# Patient Record
Sex: FEMALE | Race: WHITE | Employment: OTHER | URBAN - METROPOLITAN AREA
[De-identification: names, ages, dates, MRNs, and addresses within clinical notes are randomized per-mention and may not be internally consistent; named-entity substitution may affect disease eponyms.]

---

## 2017-01-04 ENCOUNTER — HOSPITAL ENCOUNTER (OUTPATIENT)
Dept: SPEECH THERAPY | Facility: CLINIC | Age: 77
Setting detail: THERAPIES SERIES
End: 2017-01-04
Attending: INTERNAL MEDICINE
Payer: COMMERCIAL

## 2017-01-04 DIAGNOSIS — I63.9 CEREBROVASCULAR ACCIDENT (CVA), UNSPECIFIED MECHANISM (H): Primary | ICD-10-CM

## 2017-01-04 DIAGNOSIS — S06.5XAA SDH (SUBDURAL HEMATOMA) (H): ICD-10-CM

## 2017-01-04 PROCEDURE — 92523 SPEECH SOUND LANG COMPREHEN: CPT | Mod: GN | Performed by: SPEECH-LANGUAGE PATHOLOGIST

## 2017-01-04 PROCEDURE — 40000211 ZZHC STATISTIC SLP  DEPARTMENT VISIT: Performed by: SPEECH-LANGUAGE PATHOLOGIST

## 2017-01-05 NOTE — PROGRESS NOTES
[facility] OP SPEECH-LANGUAGE EVALUATION   01/04/17 1100       Present No   General Information   Type of Evaluation Speech and Language   Type Of Visit Initial   Start Of Care Date 01/04/17   Referring Physician Dr. Luis Antonio Alvarez MD   Orders Evaluate And Treat   Medical Diagnosis CVA, acute expressive aphasia.    Onset Of Illness/injury Or Date Of Surgery 12/20/16   Precautions/Limitations no known precautions/limitations   Hearing WFL   Surgical/Medical history reviewed Yes   Pertinent History Of Current Problem Mrs. Bucio is a 76 y.o. female with a medical history significant for multiple CVA's, 2003 and 2014. Resulting impairments include acute expressive aphasia and right side weakness. She reports receiving speech and language services following this initial stroke. Mrs. Bucio is wheelchair bound due to physical limitations. Mrs. Bucio was hospitalized on 12/20/20116 through 12/22/2016 due to a stroke. She and her  report that her expressive aphasia is baseline in comparison to her abilities before this most recent stroke. Mrs. Bucio reports she is a member of the [facility] stroke support group. At this time Mrs. Bucio was referred for a formal speech-language evaluation by her physician due to concerns with acute expressive aphasia.    Prior Level Of Function Comment Mrs. Bucio has experinced right side weakness and expressive aphasia following her initial stroke in 2003.    Current Community Support  Family/friend caregiver   Patient Role/employment History Retired;Disabled   Living environment [facility] [place] Mrs. Bucio is a very pleasant and friendly woman.    Patient/family Goals Improve and/or maintain current expressive langauge skills. Learn strategies to aid in word finding.    FALL RISK SCREEN   Have you fallen 2 or more times in the last year? No   Have you fallen and had an injury in the past year? No   Is the patient a fall risk? No   Comments SLP: Pt is  "wheelchair bound at this time.    Pain Assessment   Pain Reported No   Language: Auditory Comprehension (understanding of spoken language)   Tests were administered at the following levels Basic (rote activities);Moderate (routine daily activities)   One Step Commands (out of 10 total) 10   Y/N Simple Questions (out of 10 total) 8   Two Step Commands (out of 5 total) 3   Yes/No Sentence and Simple Paragraph; [place] Diagnostic Aphasia Exam 3 short (out of 6 total) 3   Functional Assessment Scale (Auditory Comprehension) Moderate Impairment   Comments (Auditory Comprehension) SLP: Pt presents with a moderate impairment in auditory comprehension, Pt requires significantly increased processing time in order to complete the A/C tasks.    Language: Verbal Expression (use of spoken language to express information)   Tests were administered at the following levels Basic (rote activities);Moderate (routine daily activities);Complex (vocation/community/social activities)   Automatized Sequences; [place] Diagnostic Aphasia Exam (out of 8 total) 7   Phrase/Sentence Completion (out of 10 total) 6   Responsive Naming; [place] Diagnostic Aphasia Exam 3 (out of 20 total) 11   Generate Sentences; [place] Test for Differential Diagnosis Of Aphasia (out of 6 total) 2   [place] Naming Test, short form (out of 15 total) 4   Generative Naming Score; Cognitive Linguistic Quick Test 5  (Animals, words that start with \"m.\")   Generative Naming; Cognitive Linguistic Quick Test Result Below mean   Functional Assessment Scale (Verbal Expression) Moderate to Severe Impairment   Comments (Verbal Expression) SLP: Pt presents with severe verbal language impairments and requires significantly increased processing time for word generation and narrative planning/organization.    Pragmatics (the social or functional use of a language)   Deficits noted in Nonverbal None   Deficits noted in Conversational Skills None   Deficits noted in the Use of " Linguistic Context None   Deficits noted in the Organization of Narrative; RICE None   Functional Assessment Scale  (Pragmatics) No Impairment   Comments (Pragmatics) SLP: Pt's pragmatic skills are negatively impacted as a result of her expressive aphasia. Pt demonstrates appropriate non -verbal and conversational skills when aphasia is unaccounted for.    Education Assessment   Barriers to Learning Language   Preferred Learning Style Listening;Reading;Demonstration;Pictures/video   General Therapy Interventions   Planned Therapy Interventions Language   Language Auditory comprehension;Verbal expression   Clinical Impression, SLP Eval   Criteria for Skilled Therapeutic Interventions Met yes;treatment indicated   SLP Diagnosis Moderate to severe expressive aphasia, moderate receptive aphasia.    Influenced by the following factors/impairments History of 3 strokes. Pt may be at baseline ability for expressive language.    Functional limitations due to impairments Pt is unable to effectively and efficiently commuincate basic wants and needs.    Therapy Frequency 2 times;per week   Predicted Duration of Therapy Intervention (days/wks) 4-6 weeks pending progress.   (Pt and SLP in agreement of 3 week trial period for progress.)   Risks and Benefits of Treatment have been explained. Yes   Patient, Family & other staff in agreement with plan of care Yes   Clinical Impression Comments Mrs. Bucio presents with moderate to severe expressive and receptive aphasia. At this time she and her  report that she may be near her baseline ability level due to recurrence of strokes since 2003. At this time SLP recommends direct speech and language services 2x/week to teach Mrs. Bucio and her family word finding strategies to implement for improved communication in home and in the community as well as during emergency situations. Based on the results of this evaluation it is deemed medically necessary that Mrs. Bucio receive direct  speech and language services.    Language/Cognition Goals   Language/Cognition Goals 1;2;3   Language/Cognition Goal 1   Goal Identifier LTG-Expressive Language    Goal Description Patient will learn, implement, and demonstrate use of 3 word-finding strategies across 80% of communication breakdowns with min cues to meet daily expressive language needs.     Target Date 03/04/17   Language/Cognition Goal 2   Goal Identifier LTG-Aphasia   Goal Description Patient will complete moderate level word finding tasks with 80% accuracy provided moderate cues for improved communication with family and friends.    Target Date 03/04/17   Language/Cognition Goal 3   Goal Identifier LTG-Receptive Language   Goal Description Patient will complete moderate level receptive language tasks (i.e. following directions, listening to a short paragraph for information) with 80% accuracy provided moderate cues for improved understanding of questions and conversations in the community.    Target Date 03/04/17   Total Session Time   Total Evaluation Time 45 minutes        Thank you for referring Cydney Amador Rupinder to outpatient therapy at [facility] in [place].  Please call Jennifer Dodson MA, SLP-CCC at [phone] or email [email or web address] with any questions or concerns.      Jennifer Dodson M.A., SLP-CCC  Speech-Language Pathologist

## 2017-01-11 ENCOUNTER — HOSPITAL ENCOUNTER (OUTPATIENT)
Dept: SPEECH THERAPY | Facility: CLINIC | Age: 77
Setting detail: THERAPIES SERIES
End: 2017-01-11
Attending: INTERNAL MEDICINE
Payer: COMMERCIAL

## 2017-01-11 PROCEDURE — 92507 TX SP LANG VOICE COMM INDIV: CPT | Mod: GN | Performed by: SPEECH-LANGUAGE PATHOLOGIST

## 2017-01-11 PROCEDURE — 40000211 ZZHC STATISTIC SLP  DEPARTMENT VISIT: Performed by: SPEECH-LANGUAGE PATHOLOGIST

## 2017-01-13 ENCOUNTER — HOSPITAL ENCOUNTER (OUTPATIENT)
Dept: SPEECH THERAPY | Facility: CLINIC | Age: 77
Setting detail: THERAPIES SERIES
End: 2017-01-13
Attending: INTERNAL MEDICINE
Payer: COMMERCIAL

## 2017-01-13 PROCEDURE — 92507 TX SP LANG VOICE COMM INDIV: CPT | Mod: GN | Performed by: SPEECH-LANGUAGE PATHOLOGIST

## 2017-01-13 PROCEDURE — 40000211 ZZHC STATISTIC SLP  DEPARTMENT VISIT: Performed by: SPEECH-LANGUAGE PATHOLOGIST

## 2017-01-17 ENCOUNTER — OFFICE VISIT (OUTPATIENT)
Dept: DERMATOLOGY | Facility: CLINIC | Age: 77
End: 2017-01-17
Payer: COMMERCIAL

## 2017-01-17 VITALS — DIASTOLIC BLOOD PRESSURE: 63 MMHG | OXYGEN SATURATION: 95 % | SYSTOLIC BLOOD PRESSURE: 105 MMHG | HEART RATE: 79 BPM

## 2017-01-17 DIAGNOSIS — L82.1 SEBORRHEIC KERATOSIS: Primary | ICD-10-CM

## 2017-01-17 DIAGNOSIS — L81.4 LENTIGO: ICD-10-CM

## 2017-01-17 PROCEDURE — 99203 OFFICE O/P NEW LOW 30 MIN: CPT | Performed by: PHYSICIAN ASSISTANT

## 2017-01-17 NOTE — NURSING NOTE
"Chief Complaint   Patient presents with     Derm Problem     LESION ON UPPER BACK X ~ 2 YEARS - IS ENLARGING AND HAS TURNED BLACK        Initial /63 mmHg  Pulse 79  SpO2 95% Estimated body mass index is 36.80 kg/(m^2) as calculated from the following:    Height as of 12/20/16: 4' 11\" (1.499 m).    Weight as of 12/20/16: 182 lb 5.1 oz (82.7 kg)..  BP completed using cuff size: melissa Lemus LPN    "

## 2017-01-18 ENCOUNTER — HOSPITAL ENCOUNTER (OUTPATIENT)
Dept: SPEECH THERAPY | Facility: CLINIC | Age: 77
Setting detail: THERAPIES SERIES
End: 2017-01-18
Attending: INTERNAL MEDICINE
Payer: COMMERCIAL

## 2017-01-18 PROCEDURE — 40000211 ZZHC STATISTIC SLP  DEPARTMENT VISIT: Performed by: SPEECH-LANGUAGE PATHOLOGIST

## 2017-01-18 PROCEDURE — 92507 TX SP LANG VOICE COMM INDIV: CPT | Mod: GN | Performed by: SPEECH-LANGUAGE PATHOLOGIST

## 2017-01-19 ENCOUNTER — HOSPITAL ENCOUNTER (OUTPATIENT)
Dept: SPEECH THERAPY | Facility: CLINIC | Age: 77
Setting detail: THERAPIES SERIES
End: 2017-01-19
Attending: INTERNAL MEDICINE
Payer: COMMERCIAL

## 2017-01-19 PROCEDURE — 92507 TX SP LANG VOICE COMM INDIV: CPT | Mod: GN | Performed by: SPEECH-LANGUAGE PATHOLOGIST

## 2017-01-19 PROCEDURE — 40000211 ZZHC STATISTIC SLP  DEPARTMENT VISIT: Performed by: SPEECH-LANGUAGE PATHOLOGIST

## 2017-01-19 NOTE — PROGRESS NOTES
HPI:   Cydney Bucio is a 76 year old female who presents for evaluation of growth on the back  chief complaint  Location: upper back   Condition present for:  Awhile - feels like it has been growing recently. .   Previous treatments include: none  -no personal h/o skin CA - declines FSE today    -She had a fall 1 year ago with subsequent ICH. Month later her  had an AMI (he is here today). They were able to do rehab together and shared a room.     Review Of Systems  Eyes: negative  Ears/Nose/Throat: negative  Respiratory: No shortness of breath, dyspnea on exertion, cough, or hemoptysis  Cardiovascular: negative  Gastrointestinal: negative  Genitourinary: negative  Musculoskeletal: negative  Neurologic: negative  Psychiatric: negative        PHYSICAL EXAM:      Skin exam performed as follows: Type 2 skin. Mood appropriate  Alert and Oriented X 3. Well developed, well nourished in no distress.  General appearance: Normal  Head including face: Normal  Eyes: conjunctiva and lids: Normal  Mouth: Lips, teeth, gums: Normal  Neck: Normal  Chest-breast/axillae: Normal  Back: Normal  Spleen and liver: Normal  Cardiovascular: Exam of peripheral vascular system by observation for swelling, varicosities, edema: Normal  Genitalia: groin, buttocks: Normal  Extremities: digits/nails (clubbing): Normal  Eccrine and Apocrine glands: Normal  Right upper extremity: Normal  Left upper extremity: Normal  Right lower extremity: Normal  Left lower extremity: Normal  Skin: Scalp and body hair: See below    1. Keratotic papule on right upper back  2. Brown flat macules and papules on back, face    ASSESSMENT/PLAN:     1. Seborrheic keratosis on upper back - advised benign no treatment needed  2. Nevi, lentigos on back and face - benign no treatment needed        Follow-up: yearly FSE/PRN  CC:   Scribed By: Indy Bautista, MS, PA-C

## 2017-01-24 ENCOUNTER — CARE COORDINATION (OUTPATIENT)
Dept: CASE MANAGEMENT | Facility: CLINIC | Age: 77
End: 2017-01-24

## 2017-01-25 ENCOUNTER — HOSPITAL ENCOUNTER (OUTPATIENT)
Dept: SPEECH THERAPY | Facility: CLINIC | Age: 77
Setting detail: THERAPIES SERIES
End: 2017-01-25
Attending: INTERNAL MEDICINE
Payer: COMMERCIAL

## 2017-01-25 PROCEDURE — 92507 TX SP LANG VOICE COMM INDIV: CPT | Mod: GN | Performed by: SPEECH-LANGUAGE PATHOLOGIST

## 2017-01-25 PROCEDURE — 40000211 ZZHC STATISTIC SLP  DEPARTMENT VISIT: Performed by: SPEECH-LANGUAGE PATHOLOGIST

## 2017-01-25 NOTE — PROGRESS NOTES
Clinic Care Coordination Contact  [facility]/Voicemail    Referral Source: [facility]    Clinical Data: Care Coordinator Outreach    Outreach attempted x 1.  Left message on voicemail with call back information and requested return call.    Plan: Care Coordinator will try to reach patient again in 3-5 business days.      ROSALBA Kaur, RN, PHN  FPA Care Coordinator  [phone]  January 25, 2017

## 2017-01-26 ENCOUNTER — HOSPITAL ENCOUNTER (OUTPATIENT)
Dept: SPEECH THERAPY | Facility: CLINIC | Age: 77
Setting detail: THERAPIES SERIES
End: 2017-01-26
Attending: INTERNAL MEDICINE
Payer: COMMERCIAL

## 2017-01-26 PROCEDURE — 92507 TX SP LANG VOICE COMM INDIV: CPT | Mod: GN | Performed by: SPEECH-LANGUAGE PATHOLOGIST

## 2017-01-26 PROCEDURE — 40000211 ZZHC STATISTIC SLP  DEPARTMENT VISIT: Performed by: SPEECH-LANGUAGE PATHOLOGIST

## 2017-01-31 ENCOUNTER — HOSPITAL ENCOUNTER (OUTPATIENT)
Dept: SPEECH THERAPY | Facility: CLINIC | Age: 77
Setting detail: THERAPIES SERIES
End: 2017-01-31
Attending: INTERNAL MEDICINE
Payer: COMMERCIAL

## 2017-01-31 PROCEDURE — 92507 TX SP LANG VOICE COMM INDIV: CPT | Mod: GN | Performed by: SPEECH-LANGUAGE PATHOLOGIST

## 2017-01-31 PROCEDURE — 40000211 ZZHC STATISTIC SLP  DEPARTMENT VISIT: Performed by: SPEECH-LANGUAGE PATHOLOGIST

## 2017-01-31 NOTE — PROGRESS NOTES
Outpatient Speech Language Pathology Discharge Note     Patient: Cydney Bucio  : 1940    Beginning/End Dates of Reporting Period:  2017 to 2017    Referring Provider: Dr. Luis Antonio Alvarez MD    Therapy Diagnosis: Severe expressive and moderate receptive aphasia.     Client Self Report: Mrs. Bucio is a 76 y.o. Female with a past medical history significant for 3 strokes with chronic expressive/receptive aphasia. Please see the initial evaluation report in [facility] dated 17 for further information. At the time of the evaluation Pt and family expressed the they thought she may be at baseline for expressive and receptive language skills. SLP recommended Pt initiate treatment on a 2x/week basis for 4-6 weeks with a re-check at 3 weeks to determine if progress was being made. At this time Mrs. Bucio has completed 7 treatment sessions and has demonstrated minimal progress in expressive and receptive language skills. Discharge is appropriate at this time.     Objective Measurements:           Goals:  Goal Identifier LTG-Expressive Language    Goal Description Patient will learn, implement, and demonstrate use of 3 word-finding strategies across 80% of communication breakdowns with min cues to meet daily expressive language needs.     Target Date 17   Date Met   2017   Progress:Goal met with mod to max cues.      Goal Identifier LTG-Aphasia   Goal Description Patient will complete moderate level word finding tasks with 80% accuracy provided moderate cues for improved communication with family and friends.    Target Date 17   Date Met      Progress: Goal not met. Pt is achieving 60-70% accuracy with mod to max cues for use of word finding strategies. Pt and family report this is her baseline skill level.      Goal Identifier LTG-Receptive Language   Goal Description Patient will complete moderate level receptive language tasks (i.e. following directions, listening to a short paragraph for  information) with 80% accuracy provided moderate cues for improved understanding of questions and conversations in the community.    Target Date 03/04/17   Date Met      Progress: Goal not met. Pt is achieving 60-70% accuracy with mod to max cues and repetition. Pt and family report that this baseline.        Progress Toward Goals:   Progress limited due to chronic expressive/receptive aphasia. Pt and family trained in use of word finding strategies,  trained in cueing Pt to use strategies for improved word finding. Pt will continue to require lifelong cueing. SLP and family in agreement that Pt is presenting with baseline speech and language skills. No further treatment recommended at this time.         Plan:  Discharge from therapy.    Discharge: Yes     Reason for Discharge: No further expectation of progress.    Discharge Plan: Patient to continue home program.    Thank you for referring Cydney Bucio to outpatient therapy at [facility] in [place].  Please call Jennifer Dodson MA, SLP-CCC at [phone] or email [email or web address] with any questions or concerns.      Jennifer Dodson M.A., SLP-CCC  Speech-Language Pathologist

## 2017-02-01 NOTE — PROGRESS NOTES
Care Coordination Update    Clinical Data: No call back received from patient or spouse.  She is gong to outpatient therapy as recommended.  She lives with her  and son who are very supportive.    Plan:  No ongoing care coordination needs identified at this time.    ROSALBA Kaur, RN, PHN  [facility] Care Coordinator  [phone]  February 1, 2017

## 2017-02-14 DIAGNOSIS — F33.0 MAJOR DEPRESSIVE DISORDER, RECURRENT EPISODE, MILD (H): Primary | ICD-10-CM

## 2017-02-14 NOTE — TELEPHONE ENCOUNTER
venlafaxine (EFFEXOR-XR) 75 MG 24 hr capsule    Last Written Prescription Date: 11/17/2016  Last Fill Quantity: 90, # refills: 0  Last Office Visit with [facility], [facility] or [facility] prescribing provider: 12/28/2016        BP Readings from Last 3 Encounters:   01/17/17 105/63   12/28/16 118/60   12/22/16 129/73     Pulse: (for Fetzima)  Creatinine   Date Value Ref Range Status   12/20/2016 0.70 0.52 - 1.04 mg/dL Final   ]    Last PHQ-9 score on record=   PHQ-9 SCORE 8/10/2016   Total Score -   Total Score 2

## 2017-02-15 RX ORDER — VENLAFAXINE HYDROCHLORIDE 75 MG/1
75 CAPSULE, EXTENDED RELEASE ORAL DAILY
Qty: 90 CAPSULE | Refills: 1 | Status: SHIPPED | OUTPATIENT
Start: 2017-02-15 | End: 2017-06-29 | Stop reason: DRUGHIGH

## 2017-06-09 ENCOUNTER — RADIANT APPOINTMENT (OUTPATIENT)
Dept: GENERAL RADIOLOGY | Facility: CLINIC | Age: 77
End: 2017-06-09
Attending: PHYSICIAN ASSISTANT
Payer: COMMERCIAL

## 2017-06-09 ENCOUNTER — OFFICE VISIT (OUTPATIENT)
Dept: URGENT CARE | Facility: URGENT CARE | Age: 77
End: 2017-06-09
Payer: COMMERCIAL

## 2017-06-09 VITALS
TEMPERATURE: 100.4 F | RESPIRATION RATE: 24 BRPM | OXYGEN SATURATION: 92 % | DIASTOLIC BLOOD PRESSURE: 66 MMHG | SYSTOLIC BLOOD PRESSURE: 150 MMHG

## 2017-06-09 DIAGNOSIS — R09.89 CHEST CONGESTION: ICD-10-CM

## 2017-06-09 DIAGNOSIS — R06.02 SOB (SHORTNESS OF BREATH): Primary | ICD-10-CM

## 2017-06-09 DIAGNOSIS — R06.2 WHEEZING: ICD-10-CM

## 2017-06-09 DIAGNOSIS — R06.02 SOB (SHORTNESS OF BREATH): ICD-10-CM

## 2017-06-09 DIAGNOSIS — J18.9 PNEUMONIA DUE TO INFECTIOUS ORGANISM, UNSPECIFIED LATERALITY, UNSPECIFIED PART OF LUNG: ICD-10-CM

## 2017-06-09 LAB
ANION GAP SERPL CALCULATED.3IONS-SCNC: 7 MMOL/L (ref 3–14)
BASOPHILS # BLD AUTO: 0 10E9/L (ref 0–0.2)
BASOPHILS NFR BLD AUTO: 0.2 %
BUN SERPL-MCNC: 17 MG/DL (ref 7–30)
CALCIUM SERPL-MCNC: 8.6 MG/DL (ref 8.5–10.1)
CHLORIDE SERPL-SCNC: 107 MMOL/L (ref 94–109)
CO2 SERPL-SCNC: 28 MMOL/L (ref 20–32)
CREAT SERPL-MCNC: 0.66 MG/DL (ref 0.52–1.04)
DIFFERENTIAL METHOD BLD: ABNORMAL
EOSINOPHIL # BLD AUTO: 0.1 10E9/L (ref 0–0.7)
EOSINOPHIL NFR BLD AUTO: 0.5 %
ERYTHROCYTE [DISTWIDTH] IN BLOOD BY AUTOMATED COUNT: 14.7 % (ref 10–15)
GFR SERPL CREATININE-BSD FRML MDRD: 87 ML/MIN/1.7M2
GLUCOSE SERPL-MCNC: 124 MG/DL (ref 70–99)
HCT VFR BLD AUTO: 44.1 % (ref 35–47)
HGB BLD-MCNC: 13.9 G/DL (ref 11.7–15.7)
LYMPHOCYTES # BLD AUTO: 1.9 10E9/L (ref 0.8–5.3)
LYMPHOCYTES NFR BLD AUTO: 9.9 %
MCH RBC QN AUTO: 30.8 PG (ref 26.5–33)
MCHC RBC AUTO-ENTMCNC: 31.5 G/DL (ref 31.5–36.5)
MCV RBC AUTO: 98 FL (ref 78–100)
MONOCYTES # BLD AUTO: 1.6 10E9/L (ref 0–1.3)
MONOCYTES NFR BLD AUTO: 8.5 %
NEUTROPHILS # BLD AUTO: 15.2 10E9/L (ref 1.6–8.3)
NEUTROPHILS NFR BLD AUTO: 80.9 %
PLATELET # BLD AUTO: 196 10E9/L (ref 150–450)
POTASSIUM SERPL-SCNC: 3.8 MMOL/L (ref 3.4–5.3)
RBC # BLD AUTO: 4.52 10E12/L (ref 3.8–5.2)
SODIUM SERPL-SCNC: 142 MMOL/L (ref 133–144)
WBC # BLD AUTO: 18.9 10E9/L (ref 4–11)

## 2017-06-09 PROCEDURE — 94640 AIRWAY INHALATION TREATMENT: CPT | Performed by: PHYSICIAN ASSISTANT

## 2017-06-09 PROCEDURE — 80048 BASIC METABOLIC PNL TOTAL CA: CPT | Performed by: PHYSICIAN ASSISTANT

## 2017-06-09 PROCEDURE — 99214 OFFICE O/P EST MOD 30 MIN: CPT | Mod: 25 | Performed by: PHYSICIAN ASSISTANT

## 2017-06-09 PROCEDURE — 85025 COMPLETE CBC W/AUTO DIFF WBC: CPT | Performed by: PHYSICIAN ASSISTANT

## 2017-06-09 PROCEDURE — 71010 XR CHEST 1 VW: CPT

## 2017-06-09 PROCEDURE — 36415 COLL VENOUS BLD VENIPUNCTURE: CPT | Performed by: PHYSICIAN ASSISTANT

## 2017-06-09 RX ORDER — MOXIFLOXACIN HYDROCHLORIDE 400 MG/1
400 TABLET ORAL DAILY
Qty: 7 TABLET | Refills: 0 | Status: SHIPPED | OUTPATIENT
Start: 2017-06-09 | End: 2017-06-17

## 2017-06-09 RX ORDER — ALBUTEROL SULFATE 0.83 MG/ML
1 SOLUTION RESPIRATORY (INHALATION) EVERY 4 HOURS PRN
Qty: 1 BOX | Refills: 0
Start: 2017-06-09 | End: 2017-06-17

## 2017-06-09 RX ORDER — ALBUTEROL SULFATE 0.83 MG/ML
1 SOLUTION RESPIRATORY (INHALATION) ONCE
Qty: 3 ML | Refills: 0 | Status: SHIPPED
Start: 2017-06-09 | End: 2017-06-09

## 2017-06-09 RX ORDER — PREDNISONE 10 MG/1
10 TABLET ORAL 3 TIMES DAILY
Qty: 15 TABLET | Refills: 0 | Status: SHIPPED | OUTPATIENT
Start: 2017-06-09 | End: 2017-06-14

## 2017-06-09 NOTE — MR AVS SNAPSHOT
After Visit Summary   6/9/2017    Cydney Bucio    MRN: [id number]           Patient Information     Date Of Birth          1940        Visit Information        Provider Department      6/9/2017 11:00 AM Jairo Fonseca PA-C [facility]        Today's Diagnoses     SOB (shortness of breath)    -  1    Chest congestion        Pneumonia due to infectious organism, unspecified laterality, unspecified part of lung        Wheezing           Follow-ups after your visit        Who to contact     If you have questions or need follow up information about today's clinic visit or your schedule please contact [facility] [place] directly at [phone].  Normal or non-critical lab and imaging results will be communicated to you by MyChart, letter or phone within 4 business days after the clinic has received the results. If you do not hear from us within 7 days, please contact the clinic through [employer] or phone. If you have a critical or abnormal lab result, we will notify you by phone as soon as possible.  Submit refill requests through [employer] or call your pharmacy and they will forward the refill request to us. Please allow 3 business days for your refill to be completed.          Additional Information About Your Visit        MyChart Information     [employer] gives you secure access to your electronic health record. If you see a primary care provider, you can also send messages to your care team and make appointments. If you have questions, please call your primary care clinic.  If you do not have a primary care provider, please call [phone] and they will assist you.        Care EveryWhere ID     This is your Care EveryWhere ID. This could be used by other organizations to access your [facility] medical records  [id number]        Your Vitals Were     Temperature Respirations Pulse Oximetry             100.4  F (38  C) (Oral) 24 92%          Blood Pressure  from Last 3 Encounters:   06/09/17 150/66   01/17/17 105/63   12/28/16 118/60    Weight from Last 3 Encounters:   12/22/16 182 lb 5.1 oz (82.7 kg)   12/05/16 186 lb (84.4 kg)   08/10/16 186 lb (84.4 kg)              We Performed the Following     ALBUTEROL UNIT DOSE, 1 MG     Basic metabolic panel  (Ca, Cl, CO2, Creat, Gluc, K, Na, BUN)     CBC with platelets differential     INHALATION/NEBULIZER TREATMENT, INITIAL          Today's Medication Changes          These changes are accurate as of: 6/9/17  1:34 PM.  If you have any questions, ask your nurse or doctor.               Start taking these medicines.        Dose/Directions    * albuterol (2.5 MG/3ML) 0.083% neb solution   Used for:  SOB (shortness of breath), Chest congestion   Started by:  Jairo Fonseca PA-C        Dose:  1 vial   Take 1 vial (2.5 mg) by nebulization once for 1 dose   Quantity:  3 mL   Refills:  0       * albuterol (2.5 MG/3ML) 0.083% neb solution   Used for:  SOB (shortness of breath)   Started by:  Jairo Fonseca PA-C        Dose:  1 vial   Take 1 vial (2.5 mg) by nebulization every 4 hours as needed for shortness of breath / dyspnea or wheezing   Quantity:  1 Box   Refills:  0       moxifloxacin 400 MG tablet   Commonly known as:  AVELOX   Used for:  Chest congestion, Pneumonia due to infectious organism, unspecified laterality, unspecified part of lung   Started by:  Jairo Fonseca PA-C        Dose:  400 mg   Take 1 tablet (400 mg) by mouth daily   Quantity:  7 tablet   Refills:  0       predniSONE 10 MG tablet   Commonly known as:  DELTASONE   Used for:  Wheezing, SOB (shortness of breath)   Started by:  Jairo Fonseca PA-C        Dose:  10 mg   Take 1 tablet (10 mg) by mouth 3 times daily for 5 days   Quantity:  15 tablet   Refills:  0       * Notice:  This list has 2 medication(s) that are the same as other medications prescribed for you. Read the directions carefully, and ask your doctor or other care provider to review them  with you.      These medicines have changed or have updated prescriptions.        Dose/Directions    * order for DME   This may have changed:  Another medication with the same name was added. Make sure you understand how and when to take each.   Used for:  SDH (subdural hematoma) (H)   Changed by:  Jeffery Zamora MD        Equipment being ordered: Bedside commode   Quantity:  1 Units   Refills:  0       * order for DME   This may have changed:  You were already taking a medication with the same name, and this prescription was added. Make sure you understand how and when to take each.   Used for:  Pneumonia due to infectious organism, unspecified laterality, unspecified part of lung, Wheezing   Changed by:  Jairo Fonseca PA-C        Nebulizer with tubing   Quantity:  1 Device   Refills:  0       * Notice:  This list has 2 medication(s) that are the same as other medications prescribed for you. Read the directions carefully, and ask your doctor or other care provider to review them with you.         Where to get your medicines      These medications [facility]/pharmacy [facility][id number] [facility] [address] [address] [facility]  [address] [facility] [address]     Phone:  [phone]     moxifloxacin 400 MG tablet    predniSONE 10 MG tablet         Some of these will need a paper prescription and others can be bought over the counter.  Ask your nurse if you have questions.     Bring a paper prescription for each of these medications     order for DME       You don't need a prescription for these medications     albuterol (2.5 MG/3ML) 0.083% neb solution    albuterol (2.5 MG/3ML) 0.083% neb solution                Primary Care Provider Office Phone # Fax #    Jeffery Zamora [phone]       [facility] [address]  [facility] [address]        Thank you!     Thank you for choosing [facility]  for your care. Our goal is always to provide you  with excellent care. Hearing back from our patients is one way we can continue to improve our services. Please take a few minutes to complete the written survey that you may receive in the mail after your visit with us. Thank you!             Your Updated Medication List - Protect others around you: Learn how to safely use, store and throw away your medicines at www.disposemymeds.org.          This list is accurate as of: 6/9/17  1:34 PM.  Always use your most recent med list.                   Brand Name Dispense Instructions for use    acetaminophen 325 MG tablet    TYLENOL     Take 650 mg by mouth 3 times daily as needed for mild pain       * albuterol (2.5 MG/3ML) 0.083% neb solution     3 mL    Take 1 vial (2.5 mg) by nebulization once for 1 dose       * albuterol (2.5 MG/3ML) 0.083% neb solution     1 Box    Take 1 vial (2.5 mg) by nebulization every 4 hours as needed for shortness of breath / dyspnea or wheezing       alendronate 70 MG tablet    FOSAMAX         calcium carbonate-vitamin D 600-200 MG-UNIT Tabs      Take 1 tablet by mouth 2 times daily       cetirizine 10 MG tablet    zyrTEC     Take 10 mg by mouth daily       ELIQUIS PO      Take 5 mg by mouth 2 times daily       KEPPRA PO      Take 1,000 mg by mouth 2 times daily       moxifloxacin 400 MG tablet    AVELOX    7 tablet    Take 1 tablet (400 mg) by mouth daily       * order for DME     1 Units    Equipment being ordered: Bedside commode       * order for DME     1 Device    Nebulizer with tubing       potassium chloride SA 20 MEQ CR tablet    potassium chloride    180 tablet    Take 1 tablet (20 mEq) by mouth 2 times daily       predniSONE 10 MG tablet    DELTASONE    15 tablet    Take 1 tablet (10 mg) by mouth 3 times daily for 5 days       senna-docusate 8.6-50 MG per tablet    SENOKOT-S;PERICOLACE     Take 2 tablets by mouth daily as needed for constipation       simvastatin 20 MG tablet    ZOCOR    90 tablet    Take 1 tablet (20 mg) by mouth  At Bedtime       venlafaxine 75 MG 24 hr capsule    EFFEXOR-XR    90 capsule    Take 1 capsule (75 mg) by mouth daily       vitamin E 400 UNITS Tabs     MONTH    qd       * Notice:  This list has 4 medication(s) that are the same as other medications prescribed for you. Read the directions carefully, and ask your doctor or other care provider to review them with you.

## 2017-06-09 NOTE — NURSING NOTE
"Chief Complaint   Patient presents with     Cough     cough,congestion,shortness of breath for past week       Initial /66 (BP Location: Left arm, Patient Position: Chair, Cuff Size: Adult Regular)  Temp 100.4  F (38  C) (Oral)  Resp 24  SpO2 92% Estimated body mass index is 36.82 kg/(m^2) as calculated from the following:    Height as of 12/20/16: 4' 11\" (1.499 m).    Weight as of 12/22/16: 182 lb 5.1 oz (82.7 kg).  Medication Reconciliation: complete   Daysi MACHADO    "

## 2017-06-09 NOTE — PROGRESS NOTES
SUBJECTIVE:   Cydney Bucio is a 76 year old female presenting with a chief complaint of chest congestion wheezing, SOB and chills.  Onset of symptoms was 1 week(s) ago.  Course of illness is worsening.    Severity moderately severe  Current and Associated symptoms: SOB and wheezing  Treatment measures tried include OTC meds.  Predisposing factors include recent illness.    Past Medical History:   Diagnosis Date     Acute, but ill-defined, cerebrovascular disease      Allergic rhinitis due to other allergen      Aphasia due to stroke (H)      Coronary artery disease 3-12-15     Depressive disorder, not elsewhere classified      Edema      History of seizure 8/10/2016    --4/2016 --Generalized seizure in context of SDH, while on full anticoagulation      HYPERTENSION NOS 6/6/2006     Osteoporosis, unspecified      Other and unspecified hyperlipidemia      SDH (subdural hematoma) (H) 4/12/2016    --4/2016 --Presented with generalized seizure, while on warfarin for afib with Hx of CVA --S/P Craniotomy --Off anticoagulation      Unspecified cerebral artery occlusion with cerebral infarction         Allergies   Allergen Reactions     No Known Allergies          Social History   Substance Use Topics     Smoking status: Never Smoker     Smokeless tobacco: Never Used     Alcohol use No       ROS:  CONSTITUTIONAL:POSITIVE  for fever and chills  INTEGUMENTARY/SKIN: NEGATIVE for worrisome rashes, moles or lesions  ENT/MOUTH: Positive for nasal congestion  RESP:POSITIVE for cough-productive, SOB/dyspnea   CV: NEGATIVE for chest pain, palpitations or peripheral edema  GI: NEGATIVE for nausea, abdominal pain, heartburn, or change in bowel habits  MUSCULOSKELETAL: Negative for body aches  NEURO: NEGATIVE for weakness, dizziness or paresthesias    OBJECTIVE  :/66 (BP Location: Left arm, Patient Position: Chair, Cuff Size: Adult Regular)  Temp 100.4  F (38  C) (Oral)  Resp 24  SpO2 92%  GENERAL APPEARANCE: healthy, alert and  no distress  EYES: EOMI,  PERRL, conjunctiva clear  HENT: ear canals and TM's normal.  Nose and mouth without ulcers, erythema or lesions  NECK: supple, nontender, no lymphadenopathy  RESP: expiratory wheezes generalized  CV: regular rates and rhythm, normal S1 S2, no murmur noted  ABDOMEN:  soft, nontender, no HSM or masses and bowel sounds normal  NEURO: Normal strength and tone, sensory exam grossly normal,  normal speech and mentation  SKIN: no suspicious lesions or rashes    Results for orders placed or performed in visit on 06/09/17   CBC with platelets differential   Result Value Ref Range    WBC 18.9 (H) 4.0 - 11.0 10e9/L    RBC Count 4.52 3.8 - 5.2 10e12/L    Hemoglobin 13.9 11.7 - 15.7 g/dL    Hematocrit 44.1 35.0 - 47.0 %    MCV 98 78 - 100 fl    MCH 30.8 26.5 - 33.0 pg    MCHC 31.5 31.5 - 36.5 g/dL    RDW 14.7 10.0 - 15.0 %    Platelet Count 196 150 - 450 10e9/L    Diff Method Automated Method     % Neutrophils 80.9 %    % Lymphocytes 9.9 %    % Monocytes 8.5 %    % Eosinophils 0.5 %    % Basophils 0.2 %    Absolute Neutrophil 15.2 (H) 1.6 - 8.3 10e9/L    Absolute Lymphocytes 1.9 0.8 - 5.3 10e9/L    Absolute Monocytes 1.6 (H) 0.0 - 1.3 10e9/L    Absolute Eosinophils 0.1 0.0 - 0.7 10e9/L    Absolute Basophils 0.0 0.0 - 0.2 10e9/L   Basic metabolic panel  (Ca, Cl, CO2, Creat, Gluc, K, Na, BUN)   Result Value Ref Range    Sodium 142 133 - 144 mmol/L    Potassium 3.8 3.4 - 5.3 mmol/L    Chloride 107 94 - 109 mmol/L    Carbon Dioxide 28 20 - 32 mmol/L    Anion Gap 7 3 - 14 mmol/L    Glucose 124 (H) 70 - 99 mg/dL    Urea Nitrogen 17 7 - 30 mg/dL    Creatinine 0.66 0.52 - 1.04 mg/dL    GFR Estimate 87 >60 mL/min/1.7m2    GFR Estimate If Black >90   GFR Calc   >60 mL/min/1.7m2    Calcium 8.6 8.5 - 10.1 mg/dL     CHEST ONE VIEW  6/9/2017 12:10 PM      HISTORY: Shortness of breath. Other specified symptoms and signs  involving the circulatory and respiratory systems.     COMPARISON: December 11,  2014         IMPRESSION: Heart size is normal considering AP technique. No pleural  effusion, pneumothorax, or abnormal area of consolidation. The  pulmonary vasculature appears somewhat prominent, especially in  comparison to previous exam. No acute osseous abnormality.      Albuterol neb treatment given in chart    ASSESSMENT/PLAN:      ICD-10-CM    1. SOB (shortness of breath) R06.02 CBC with platelets differential     INHALATION/NEBULIZER TREATMENT, INITIAL     albuterol (2.5 MG/3ML) 0.083% neb solution     Basic metabolic panel  (Ca, Cl, CO2, Creat, Gluc, K, Na, BUN)     ALBUTEROL UNIT DOSE, 1 MG     albuterol (2.5 MG/3ML) 0.083% neb solution     predniSONE (DELTASONE) 10 MG tablet     CANCELED: XR Chest 2 Views   2. Chest congestion R09.89 CBC with platelets differential     INHALATION/NEBULIZER TREATMENT, INITIAL     albuterol (2.5 MG/3ML) 0.083% neb solution     ALBUTEROL UNIT DOSE, 1 MG     moxifloxacin (AVELOX) 400 MG tablet     CANCELED: XR Chest 2 Views   3. Pneumonia due to infectious organism, unspecified laterality, unspecified part of lung J18.9 moxifloxacin (AVELOX) 400 MG tablet     order for DME   4. Wheezing R06.2 order for DME     predniSONE (DELTASONE) 10 MG tablet       Fluids  Rest  Follow up with PCP as needed  Go to the ED if symptoms worsen

## 2017-06-17 ENCOUNTER — APPOINTMENT (OUTPATIENT)
Dept: GENERAL RADIOLOGY | Facility: CLINIC | Age: 77
End: 2017-06-17
Attending: EMERGENCY MEDICINE
Payer: COMMERCIAL

## 2017-06-17 ENCOUNTER — HOSPITAL ENCOUNTER (EMERGENCY)
Facility: CLINIC | Age: 77
Discharge: HOME | End: 2017-06-17
Attending: EMERGENCY MEDICINE | Admitting: EMERGENCY MEDICINE
Payer: COMMERCIAL

## 2017-06-17 VITALS
WEIGHT: 180 LBS | BODY MASS INDEX: 35.34 KG/M2 | HEART RATE: 100 BPM | OXYGEN SATURATION: 94 % | HEIGHT: 60 IN | TEMPERATURE: 98.5 F | DIASTOLIC BLOOD PRESSURE: 42 MMHG | RESPIRATION RATE: 20 BRPM | SYSTOLIC BLOOD PRESSURE: 108 MMHG

## 2017-06-17 DIAGNOSIS — J20.9 ACUTE BRONCHITIS, UNSPECIFIED ORGANISM: ICD-10-CM

## 2017-06-17 LAB
ANION GAP SERPL CALCULATED.3IONS-SCNC: 6 MMOL/L (ref 3–14)
BASOPHILS # BLD AUTO: 0.1 10E9/L (ref 0–0.2)
BASOPHILS NFR BLD AUTO: 0.5 %
BUN SERPL-MCNC: 20 MG/DL (ref 7–30)
CALCIUM SERPL-MCNC: 8.4 MG/DL (ref 8.5–10.1)
CHLORIDE SERPL-SCNC: 105 MMOL/L (ref 94–109)
CO2 SERPL-SCNC: 30 MMOL/L (ref 20–32)
CREAT SERPL-MCNC: 0.72 MG/DL (ref 0.52–1.04)
DIFFERENTIAL METHOD BLD: ABNORMAL
EOSINOPHIL # BLD AUTO: 0.6 10E9/L (ref 0–0.7)
EOSINOPHIL NFR BLD AUTO: 4.2 %
ERYTHROCYTE [DISTWIDTH] IN BLOOD BY AUTOMATED COUNT: 14.2 % (ref 10–15)
GFR SERPL CREATININE-BSD FRML MDRD: 78 ML/MIN/1.7M2
GLUCOSE SERPL-MCNC: 154 MG/DL (ref 70–99)
HCT VFR BLD AUTO: 42.8 % (ref 35–47)
HGB BLD-MCNC: 13.8 G/DL (ref 11.7–15.7)
IMM GRANULOCYTES # BLD: 0.1 10E9/L (ref 0–0.4)
IMM GRANULOCYTES NFR BLD: 0.8 %
LYMPHOCYTES # BLD AUTO: 2.3 10E9/L (ref 0.8–5.3)
LYMPHOCYTES NFR BLD AUTO: 17.7 %
MCH RBC QN AUTO: 30.1 PG (ref 26.5–33)
MCHC RBC AUTO-ENTMCNC: 32.2 G/DL (ref 31.5–36.5)
MCV RBC AUTO: 93 FL (ref 78–100)
MONOCYTES # BLD AUTO: 1.5 10E9/L (ref 0–1.3)
MONOCYTES NFR BLD AUTO: 11.6 %
NEUTROPHILS # BLD AUTO: 8.6 10E9/L (ref 1.6–8.3)
NEUTROPHILS NFR BLD AUTO: 65.2 %
NRBC # BLD AUTO: 0 10*3/UL
NRBC BLD AUTO-RTO: 0 /100
NT-PROBNP SERPL-MCNC: 703 PG/ML (ref 0–1800)
PLATELET # BLD AUTO: 242 10E9/L (ref 150–450)
POTASSIUM SERPL-SCNC: 3.5 MMOL/L (ref 3.4–5.3)
RBC # BLD AUTO: 4.58 10E12/L (ref 3.8–5.2)
SODIUM SERPL-SCNC: 141 MMOL/L (ref 133–144)
WBC # BLD AUTO: 13.2 10E9/L (ref 4–11)

## 2017-06-17 PROCEDURE — 83880 ASSAY OF NATRIURETIC PEPTIDE: CPT | Performed by: EMERGENCY MEDICINE

## 2017-06-17 PROCEDURE — 80048 BASIC METABOLIC PNL TOTAL CA: CPT | Performed by: EMERGENCY MEDICINE

## 2017-06-17 PROCEDURE — 71020 XR CHEST 2 VW: CPT

## 2017-06-17 PROCEDURE — 87040 BLOOD CULTURE FOR BACTERIA: CPT | Performed by: EMERGENCY MEDICINE

## 2017-06-17 PROCEDURE — 36415 COLL VENOUS BLD VENIPUNCTURE: CPT | Performed by: EMERGENCY MEDICINE

## 2017-06-17 PROCEDURE — 93005 ELECTROCARDIOGRAM TRACING: CPT

## 2017-06-17 PROCEDURE — 99285 EMERGENCY DEPT VISIT HI MDM: CPT | Mod: 25

## 2017-06-17 PROCEDURE — 85025 COMPLETE CBC W/AUTO DIFF WBC: CPT | Performed by: EMERGENCY MEDICINE

## 2017-06-17 RX ORDER — AZITHROMYCIN 250 MG/1
TABLET, FILM COATED ORAL
Qty: 6 TABLET | Refills: 0 | Status: SHIPPED | OUTPATIENT
Start: 2017-06-17 | End: 2017-06-22

## 2017-06-17 RX ORDER — LIDOCAINE 40 MG/G
CREAM TOPICAL
Status: DISCONTINUED | OUTPATIENT
Start: 2017-06-17 | End: 2017-06-17 | Stop reason: HOSPADM

## 2017-06-17 ASSESSMENT — ENCOUNTER SYMPTOMS
NUMBNESS: 0
COUGH: 1
FEVER: 0
CHILLS: 0
SHORTNESS OF BREATH: 1

## 2017-06-17 NOTE — ED NOTES
Pt presents for evaluation of shortness of breath, RUIZ, generalized weakness and cough since last Friday. Pt was seen at her clinic last Friday and was diagnosed with pneumonia and was given amoxicillin and prednisone. Pt finished all meds, got slightly better with symptoms returning last night and progressively getting worse.

## 2017-06-17 NOTE — ED PROVIDER NOTES
History     Chief Complaint:  Shortness of Breath    HPI   Cydney Bucio is a 76 year old female who presents to the ED with her  for evaluation of shortness of breath. The patient reports that she was seen in clinic nine days ago for symptoms of shortness of breath, dyspnea on exertion, generalized weakness, and cough, and was diagnosed with pneumonia. Patient was provided with amoxicillin and prednisone. She states she felt slightly improved, but after finishing her medication she noticed a return of her symptoms last night, and these have been worsening. She reports her shortness of breath is worsened if she is lying down flat, and thus tries to sleep at a 30 degree angle. She denies any history of CHF or heart problems. She denies any fever, chills, or numbness. Patient is not a smoker and has not been around secondhand smoke. Of note, the patient has had several strokes in the past and has expressive aphasia.     XR Chest 1 View ([facility] - 6/9/2017)  Heart size is normal considering AP technique. No pleural effusion, pneumothorax, or abnormal area of consolidation. The pulmonary vasculature appears somewhat prominent, especially in  comparison to previous exam. No acute osseous abnormality.  Per radiology.     Allergies:  NKDA     Medications:    Avelox  Albuterol   Venlafaxine  Fosamax  Keppra  Eliquis  Zocor  Zyrtec  Calcium carbonate-Vitamin D  Tylenol  Senna-docusate  Potassium chloride   Vitamin E     Past Medical History:    Acute, but ill-defined, cerebrovascular disease  Allergic rhinitis  Aphasia due to stroke  Coronary artery disease - 3/12/2015  Depressive disorder  Edema  History of seizure - 8/10/2016  Hypertension - 6/6/2006  Osteoporosis   Hyperlipidemia   Subdural hematoma - 4/12/2016  Unspecified cerebral artery occlusion with cerebral infarction     Past Surgical History:    Vertarra fused lower back - 2002  Hymenotomy   Tonsillectomy   D&C - 1988 &  1997  Hysteroscopy   Laminectomy - 2003  Colonoscopy   Craniotomy, left - 4/13/2016  Orthopedic surgery     Family History:    Mother - MI, uterine cancer, diabetes, coronary artery disease  Father - CHF, multiple MIs, AAA, colon cancer, colorectal cancer  Sister - osteoarthritis     Social History:  The patient was accompanied to the ED by her .  Smoking Status: Never smoker  Smokeless Tobacco: Never used  Alcohol Use: No  Marital Status:        Review of Systems   Constitutional: Negative for chills and fever.   Respiratory: Positive for cough and shortness of breath.         Positive for dyspnea on exertion.    Neurological: Negative for numbness.   All other systems reviewed and are negative.    Physical Exam   First Vitals:  BP: 132/69  Pulse: 100  Temp: 98.5  F (36.9  C)  Resp: 28  Height: 152.4 cm (5')  Weight: 81.6 kg (180 lb)  SpO2: 94 %      Physical Exam   General: Patient is alert and cooperative.  HENT: Mild nasal congestion, no drainage.   Eyes: EOMI, PERRLA.  Normal conjunctiva.  Neck: Normal range of motion. Neck supple.  Cardiovascular: Normal rate, regular rhythm and normal heart sounds.   Pulmonary/Chest: occasional cough, coarse rhonchi, no wheezing.   Abdominal: Soft. Patient exhibits no distension and no mass. There is no tenderness.       Musculoskeletal: Normal range of motion. Patient exhibits no edema and no tenderness.   Neurological: Patient  is alert and oriented. Coordination normal, grossly intact.  Skin: Skin is warm and dry. No rash noted.   Psychiatric: Patient has a normal mood and affect. Normal behavior and judgement.      Emergency Department Course   ECG done at 15:45. ECG read at 15:55:  Rate 97 bpm. MN interval 142. QRS duration 86. QT/QTc 360/457. P-R-T axes 62 -13 72.  Normal sinus rhythm. Cannot rule out inferior infarct, age undetermined. Abnormal ECG.     Imaging:  Radiographic findings were communicated with the patient who voiced understanding of the  findings.  XR Chest 2 Views:  Clear lungs.   Per radiology.     Laboratory:  Blood culture x2: Pending  CBC: WBC 13.2 high, o/w WNL (HGB 13.8, )   BMP: Glucose 154 high, calcium 8.4 low, o/w WNL (creatinine 0.72)   BNP: 703    Emergency Department Course:  Nursing notes and vitals reviewed.  I performed an exam of the patient as documented above.     Blood drawn. This was sent to the lab for further testing, results above.  The patient was sent for a chest XR while here in the emergency department, findings above.    Findings and plan explained to the Patient. Patient discharged home with instructions regarding supportive care, medications, and reasons to return. The importance of close follow-up was reviewed. The patient was prescribed Azithromycin.    Impression & Plan      Medical Decision Making:  Cydney Bucio is an afebrile 76 year old female who arrives by car with spouse for evaluation of cough and shortness of breath. They are reporting that she was seen in clinic on 6/9/2017, and was prescribed a course of antibiotics for possible pneumonia. She has completed a course of moxifloxacin, reporting initial improvement but subsequent recurrence of symptoms, prompting her visit today. Review of chest x-ray from that visit shows no reported evidence of pneumonia radiographically. On arrival here, she has frequent, congested sounding cough and some coarse rhonchi, but normal oxygen saturations. Her workup here has been unremarkable. Chest x-ray shows no evidence of pneumonia or other acute abnormalities. She has a normal electrocardiogram, normal BNP, and very mild nonspecific leukocytosis. I believe she is stable for discharge home. Given her age and comorbidities along with a congested sounding (and at times, productive) cough, I have recommended treatment for possible acute bronchitis and early follow up. There is no concern for a cardiogenic etiology.     Diagnosis:    ICD-10-CM    1. Acute bronchitis,  unspecified organism J20.9      Disposition:  Discharged to home.    Discharge Medications:  New Prescriptions    AZITHROMYCIN (ZITHROMAX Z-JAVIER) 250 MG TABLET    Two tablets on the first day, then one tablet daily for the next 4 days       Rachid MAGALLANES, am serving as a scribe at 3:49 PM on 6/17/2017 to document services personally performed by Aquiles Gonzalez MD based on my observations and the provider's statements to me.   [facility] EMERGENCY DEPARTMENT       Aquiles Gonzalez MD  06/18/17 1011

## 2017-06-17 NOTE — DISCHARGE INSTRUCTIONS
Bronchitis, Antibiotic Treatment (Adult)    Bronchitis is an infection of the air passages (bronchial tubes) in your lungs. It often occurs when you have a cold. This illness is contagious during the first few days and is spread through the air by coughing and sneezing, or by direct contact (touching the sick person and then touching your own eyes, nose, or mouth).  Symptoms of bronchitis include cough with mucus (phlegm) and low-grade fever. Bronchitis usually lasts 7 to 14 days. Mild cases can be treated with simple home remedies. More severe infection is treated with an antibiotic.  Home care  Follow these guidelines when caring for yourself at home:    If your symptoms are severe, rest at home for the first 2 to 3 days. When you go back to your usual activities, don't let yourself get too tired.    Do not smoke. Also avoid being exposed to secondhand smoke.    You may use over-the-counter medicines to control fever or pain, unless another medicine was prescribed. (Note: If you have chronic liver or kidney disease or have ever had a stomach ulcer or gastrointestinal bleeding, talk with your healthcare provider before using these medicines. Also talk to your provider if you are taking medicine to prevent blood clots.) Aspirin should never be given to anyone younger than 18 years of age who is ill with a viral infection or fever. It may cause severe liver or brain damage.    Your appetite may be poor, so a light diet is fine. Avoid dehydration by drinking 6 to 8 glasses of fluids per day (such as water, soft drinks, sports drinks, juices, tea, or soup). Extra fluids will help loosen secretions in the nose and lungs.    Over-the-counter cough, cold, and sore-throat medicines will not shorten the length of the illness, but they may be helpful to reduce symptoms. (Note: Do not use decongestants if you have high blood pressure.)    Finish all antibiotic medicine. Do this even if you are feeling better after only a  few days.  Follow-up care  Follow up with your healthcare provider, or as advised. If you had an X-ray or ECG (electrocardiogram), a specialist will review it. You will be notified of any new findings that may affect your care.  Note: If you are age 65 or older, or if you have a chronic lung disease or condition that affects your immune system, or you smoke, talk to your healthcare provider about having pneumococcal vaccinations and a yearly influenza vaccination (flu shot).  When to seek medical advice  Call your healthcare provider right away if any of these occur:    Fever of 100.4 F (38 C) or higher    Coughing up increased amounts of colored sputum    Weakness, drowsiness, headache, facial pain, ear pain, or a stiff neck  Call 911, or get immediate medical care  Contact emergency services right away if any of these occur.    Coughing up blood    Worsening weakness, drowsiness, headache, or stiff neck    Trouble breathing, wheezing, or pain with breathing  Date Last Reviewed: 9/13/2015 2000-2017 [employer]. [address] [place] [address], [place] [address]. All rights reserved. This information is not intended as a substitute for professional medical care. Always follow your healthcare professional's instructions.

## 2017-06-17 NOTE — ED AVS SNAPSHOT
[facility] Emergency Department    [address]    [facility] [address]    Phone:  [phone]    Fax:  [phone]                                       Cydney Bucio   MRN: [id number]    Department:  [facility] Emergency Department   Date of Visit:  6/17/2017           Patient Information     Date Of Birth          1940        Your diagnoses for this visit were:     Acute bronchitis, unspecified organism        You were seen by Aquiles Gonzalez MD.      Follow-up Information     Follow up with Jeffery Zamora MD.    Specialty:  Internal Medicine    Why:  for re-evaluation of your symptoms next week if not improving    Contact information:    [facility]  [address] [facility] [address] [phone]          Discharge Instructions         Bronchitis, Antibiotic Treatment (Adult)    Bronchitis is an infection of the air passages (bronchial tubes) in your lungs. It often occurs when you have a cold. This illness is contagious during the first few days and is spread through the air by coughing and sneezing, or by direct contact (touching the sick person and then touching your own eyes, nose, or mouth).  Symptoms of bronchitis include cough with mucus (phlegm) and low-grade fever. Bronchitis usually lasts 7 to 14 days. Mild cases can be treated with simple home remedies. More severe infection is treated with an antibiotic.  Home care  Follow these guidelines when caring for yourself at home:    If your symptoms are severe, rest at home for the first 2 to 3 days. When you go back to your usual activities, don't let yourself get too tired.    Do not smoke. Also avoid being exposed to secondhand smoke.    You may use over-the-counter medicines to control fever or pain, unless another medicine was prescribed. (Note: If you have chronic liver or kidney disease or have ever had a stomach ulcer or gastrointestinal bleeding, talk with your healthcare  provider before using these medicines. Also talk to your provider if you are taking medicine to prevent blood clots.) Aspirin should never be given to anyone younger than 18 years of age who is ill with a viral infection or fever. It may cause severe liver or brain damage.    Your appetite may be poor, so a light diet is fine. Avoid dehydration by drinking 6 to 8 glasses of fluids per day (such as water, soft drinks, sports drinks, juices, tea, or soup). Extra fluids will help loosen secretions in the nose and lungs.    Over-the-counter cough, cold, and sore-throat medicines will not shorten the length of the illness, but they may be helpful to reduce symptoms. (Note: Do not use decongestants if you have high blood pressure.)    Finish all antibiotic medicine. Do this even if you are feeling better after only a few days.  Follow-up care  Follow up with your healthcare provider, or as advised. If you had an X-ray or ECG (electrocardiogram), a specialist will review it. You will be notified of any new findings that may affect your care.  Note: If you are age 65 or older, or if you have a chronic lung disease or condition that affects your immune system, or you smoke, talk to your healthcare provider about having pneumococcal vaccinations and a yearly influenza vaccination (flu shot).  When to seek medical advice  Call your healthcare provider right away if any of these occur:    Fever of 100.4 F (38 C) or higher    Coughing up increased amounts of colored sputum    Weakness, drowsiness, headache, facial pain, ear pain, or a stiff neck  Call 911, or get immediate medical care  Contact emergency services right away if any of these occur.    Coughing up blood    Worsening weakness, drowsiness, headache, or stiff neck    Trouble breathing, wheezing, or pain with breathing  Date Last Reviewed: 9/13/2015 2000-2017 [employer]. [address] [place] [address], [place] [address]. All rights reserved. This information  is not intended as a substitute for professional medical care. Always follow your healthcare professional's instructions.          24 Hour Appointment Hotline       To make an appointment at any [facility], call [phone] ([phone]). If you don't have a family doctor or clinic, we will help you find one. [place] clinics are conveniently located to serve the needs of you and your family.             Review of your medicines      START taking        Dose / Directions Last dose taken    azithromycin 250 MG tablet   Commonly known as:  ZITHROMAX Z-JAVIER   Quantity:  6 tablet        Two tablets on the first day, then one tablet daily for the next 4 days   Refills:  0          Our records show that you are taking the medicines listed below. If these are incorrect, please call your family doctor or clinic.        Dose / Directions Last dose taken    acetaminophen 325 MG tablet   Commonly known as:  TYLENOL   Dose:  650 mg        Take 650 mg by mouth 3 times daily as needed for mild pain   Refills:  0        alendronate 70 MG tablet   Commonly known as:  FOSAMAX        Refills:  0        calcium carbonate-vitamin D 600-200 MG-UNIT Tabs   Dose:  1 tablet        Take 1 tablet by mouth 2 times daily   Refills:  0        cetirizine 10 MG tablet   Commonly known as:  zyrTEC   Dose:  10 mg        Take 10 mg by mouth daily   Refills:  0        ELIQUIS PO   Dose:  5 mg        Take 5 mg by mouth 2 times daily   Refills:  0        KEPPRA PO   Dose:  1000 mg        Take 1,000 mg by mouth 2 times daily   Refills:  0        * order for DME   Quantity:  1 Units        Equipment being ordered: Bedside commode   Refills:  0        * order for DME   Quantity:  1 Device        Nebulizer with tubing   Refills:  0        potassium chloride SA 20 MEQ CR tablet   Commonly known as:  potassium chloride   Dose:  20 mEq   Quantity:  180 tablet        Take 1 tablet (20 mEq) by mouth 2 times daily   Refills:  0        senna-docusate  8.6-50 MG per tablet   Commonly known as:  SENOKOT-S;PERICOLACE   Dose:  2 tablet        Take 2 tablets by mouth daily as needed for constipation   Refills:  0        simvastatin 20 MG tablet   Commonly known as:  ZOCOR   Dose:  20 mg   Quantity:  90 tablet        Take 1 tablet (20 mg) by mouth At Bedtime   Refills:  3        venlafaxine 75 MG 24 hr capsule   Commonly known as:  EFFEXOR-XR   Dose:  75 mg   Quantity:  90 capsule        Take 1 capsule (75 mg) by mouth daily   Refills:  1        vitamin E 400 UNITS Tabs   Quantity:  MONTH        qd   Refills:  11        * Notice:  This list has 2 medication(s) that are the same as other medications prescribed for you. Read the directions carefully, and ask your doctor or other care provider to review them with you.            Prescriptions were sent or printed at these locations (1 Prescription)                   Other Prescriptions                Printed at Department/Unit printer (1 of 1)         azithromycin (ZITHROMAX Z-JAVIER) 250 MG tablet                Procedures and tests performed during your visit     Procedure/Test Number of Times Performed    BNP 1    Basic metabolic panel 1    Blood culture 2    CBC with platelets differential 1    EKG 12 lead 1    Peripheral IV: Standard 1    Pulse oximetry nursing 1    XR Chest 2 Views 1      Orders Needing Specimen Collection     None      Pending Results     Date and Time Order Name Status Description    6/17/2017 1601 Blood culture In process     6/17/2017 1558 XR Chest 2 Views Preliminary     6/17/2017 1558 Blood culture In process     6/17/2017 1547 EKG 12 lead Preliminary             Pending Culture Results     Date and Time Order Name Status Description    6/17/2017 1601 Blood culture In process     6/17/2017 1558 Blood culture In process             Pending Results Instructions     If you had any lab results that were not finalized at the time of your Discharge, you can call the ED Lab Result RN at [phone].  You will be contacted by this team for any positive Lab results or changes in treatment. The nurses are available 7 days a week from 10A to 6:30P.  You can leave a message 24 hours per day and they will return your call.        Test Results From Your Hospital Stay        6/17/2017  4:18 PM      Component Results     Component Value Ref Range & Units Status    WBC 13.2 (H) 4.0 - 11.0 10e9/L Final    RBC Count 4.58 3.8 - 5.2 10e12/L Final    Hemoglobin 13.8 11.7 - 15.7 g/dL Final    Hematocrit 42.8 35.0 - 47.0 % Final    MCV 93 78 - 100 fl Final    MCH 30.1 26.5 - 33.0 pg Final    MCHC 32.2 31.5 - 36.5 g/dL Final    RDW 14.2 10.0 - 15.0 % Final    Platelet Count 242 150 - 450 10e9/L Final    Diff Method Automated Method  Final    % Neutrophils 65.2 % Final    % Lymphocytes 17.7 % Final    % Monocytes 11.6 % Final    % Eosinophils 4.2 % Final    % Basophils 0.5 % Final    % Immature Granulocytes 0.8 % Final    Nucleated RBCs 0 0 /100 Final    Absolute Neutrophil 8.6 (H) 1.6 - 8.3 10e9/L Final    Absolute Lymphocytes 2.3 0.8 - 5.3 10e9/L Final    Absolute Monocytes 1.5 (H) 0.0 - 1.3 10e9/L Final    Absolute Eosinophils 0.6 0.0 - 0.7 10e9/L Final    Absolute Basophils 0.1 0.0 - 0.2 10e9/L Final    Abs Immature Granulocytes 0.1 0 - 0.4 10e9/L Final    Absolute Nucleated RBC 0.0  Final         6/17/2017  4:36 PM      Component Results     Component Value Ref Range & Units Status    Sodium 141 133 - 144 mmol/L Final    Potassium 3.5 3.4 - 5.3 mmol/L Final    Chloride 105 94 - 109 mmol/L Final    Carbon Dioxide 30 20 - 32 mmol/L Final    Anion Gap 6 3 - 14 mmol/L Final    Glucose 154 (H) 70 - 99 mg/dL Final    Urea Nitrogen 20 7 - 30 mg/dL Final    Creatinine 0.72 0.52 - 1.04 mg/dL Final    GFR Estimate 78 >60 mL/min/1.7m2 Final    Non  GFR Calc    GFR Estimate If Black >90   GFR Calc   >60 mL/min/1.7m2 Final    Calcium 8.4 (L) 8.5 - 10.1 mg/dL Final         6/17/2017  4:15 PM          6/17/2017  4:44 PM      Narrative     CHEST TWO VIEW 6/17/2017 4:35 PM     COMPARISON: Frontal chest x-ray 6/9/2017.    HISTORY: Shortness of breath.    FINDINGS: The cardiac silhouette, pulmonary vasculature, lungs and  pleural spaces are within normal limits.        Impression     IMPRESSION: Clear lungs.         6/17/2017  4:36 PM      Component Results     Component Value Ref Range & Units Status    N-Terminal Pro BNP Inpatient 703 0 - 1800 pg/mL Final    Reference range shown and results flagged as abnormal are suggested inpatient   cut points for confirming diagnosis if CHF in an acute setting. Establishing   a   baseline value for each individual patient is useful for follow-up. An   inpatient or emergency department NT-proPBNP <300 pg/mL effectively rules out   acute CHF, with 99% negative predictive value.  The outpatient non-acute reference range for ruling out CHF is:   0-125 pg/mL (age 18 to less than 75)   0-450 pg/mL (age 75 yrs and older)           6/17/2017  4:33 PM                Clinical Quality Measure: Blood Pressure Screening     Your blood pressure was checked while you were in the emergency department today. The last reading we obtained was  BP: 109/58 . Please read the guidelines below about what these numbers mean and what you should do about them.  If your systolic blood pressure (the top number) is less than 120 and your diastolic blood pressure (the bottom number) is less than 80, then your blood pressure is normal. There is nothing more that you need to do about it.  If your systolic blood pressure (the top number) is 120-139 or your diastolic blood pressure (the bottom number) is 80-89, your blood pressure may be higher than it should be. You should have your blood pressure rechecked within a year by a primary care provider.  If your systolic blood pressure (the top number) is 140 or greater or your diastolic blood pressure (the bottom number) is 90 or greater, you may have high blood  pressure. High blood pressure is treatable, but if left untreated over time it can put you at risk for heart attack, stroke, or kidney failure. You should have your blood pressure rechecked by a primary care provider within the next 4 weeks.  If your provider in the emergency department today gave you specific instructions to follow-up with your doctor or provider even sooner than that, you should follow that instruction and not wait for up to 4 weeks for your follow-up visit.        Thank you for choosing [facility]       Thank you for choosing [facility] for your care. Our goal is always to provide you with excellent care. Hearing back from our patients is one way we can continue to improve our services. Please take a few minutes to complete the written survey that you may receive in the mail after you visit with us. Thank you!        [employer] Information     [facility] gives you secure access to your electronic health record. If you see a primary care provider, you can also send messages to your care team and make appointments. If you have questions, please call your primary care clinic.  If you do not have a primary care provider, please call [phone] and they will assist you.        Care EveryWhere ID     This is your Care EveryWhere ID. This could be used by other organizations to access your [facility] medical records  [id number]        After Visit Summary       This is your record. Keep this with you and show to your community pharmacist(s) and doctor(s) at your next visit.

## 2017-06-17 NOTE — ED AVS SNAPSHOT
[facility] Emergency Department    [address]    [facility] [address]    Phone:  [phone]    Fax:  [phone]                                       Cydney Bucio   MRN: [id number]    Department:  [facility] Emergency Department   Date of Visit:  6/17/2017           After Visit Summary Signature Page     I have received my discharge instructions, and my questions have been answered. I have discussed any challenges I see with this plan with the nurse or doctor.    ..........................................................................................................................................  Patient/Patient Representative Signature      ..........................................................................................................................................  Patient Representative Print Name and Relationship to Patient    ..................................................               ................................................  Date                                            Time    ..........................................................................................................................................  Reviewed by Signature/Title    ...................................................              ..............................................  Date                                                            Time

## 2017-06-18 LAB — INTERPRETATION ECG - MUSE: NORMAL

## 2017-06-23 LAB
BACTERIA SPEC CULT: NO GROWTH
BACTERIA SPEC CULT: NO GROWTH
Lab: NORMAL
Lab: NORMAL
MICRO REPORT STATUS: NORMAL
MICRO REPORT STATUS: NORMAL
SPECIMEN SOURCE: NORMAL
SPECIMEN SOURCE: NORMAL

## 2017-06-29 ENCOUNTER — OFFICE VISIT (OUTPATIENT)
Dept: INTERNAL MEDICINE | Facility: CLINIC | Age: 77
End: 2017-06-29
Payer: COMMERCIAL

## 2017-06-29 VITALS
OXYGEN SATURATION: 96 % | HEART RATE: 78 BPM | DIASTOLIC BLOOD PRESSURE: 56 MMHG | SYSTOLIC BLOOD PRESSURE: 104 MMHG | WEIGHT: 180 LBS | TEMPERATURE: 97.5 F | BODY MASS INDEX: 35.15 KG/M2

## 2017-06-29 DIAGNOSIS — F32.0 MILD MAJOR DEPRESSION (H): Primary | ICD-10-CM

## 2017-06-29 DIAGNOSIS — S99.929S: ICD-10-CM

## 2017-06-29 PROCEDURE — 99214 OFFICE O/P EST MOD 30 MIN: CPT | Performed by: INTERNAL MEDICINE

## 2017-06-29 RX ORDER — VENLAFAXINE HYDROCHLORIDE 150 MG/1
150 CAPSULE, EXTENDED RELEASE ORAL DAILY
Qty: 90 CAPSULE | Refills: 3 | Status: SHIPPED | OUTPATIENT
Start: 2017-06-29 | End: 2018-01-01

## 2017-06-29 NOTE — NURSING NOTE
Chief Complaint   Patient presents with     Bleeding/Bruising       Initial /56 (BP Location: Left arm, Patient Position: Chair, Cuff Size: Adult Regular)  Pulse 78  Temp 97.5  F (36.4  C) (Oral)  Wt 180 lb (81.6 kg)  SpO2 96%  BMI 35.15 kg/m2 Estimated body mass index is 35.15 kg/(m^2) as calculated from the following:    Height as of 6/17/17: 5' (1.524 m).    Weight as of this encounter: 180 lb (81.6 kg).  Medication Reconciliation: complete

## 2017-06-29 NOTE — MR AVS SNAPSHOT
After Visit Summary   6/29/2017    Cydney Bucio    MRN: [id number]           Patient Information     Date Of Birth          1940        Visit Information        Provider Department      6/29/2017 11:45 AM Jeffery Zamora MD [facility]        Today's Diagnoses     Mild major depression (H)    -  1    Toe injury, unspecified laterality, sequela           Follow-ups after your visit        Who to contact     If you have questions or need follow up information about today's clinic visit or your schedule please contact [facility] directly at [phone].  Normal or non-critical lab and imaging results will be communicated to you by MyChart, letter or phone within 4 business days after the clinic has received the results. If you do not hear from us within 7 days, please contact the clinic through [facility] or phone. If you have a critical or abnormal lab result, we will notify you by phone as soon as possible.  Submit refill requests through [facility] or call your pharmacy and they will forward the refill request to us. Please allow 3 business days for your refill to be completed.          Additional Information About Your Visit        MyChart Information     [facility] gives you secure access to your electronic health record. If you see a primary care provider, you can also send messages to your care team and make appointments. If you have questions, please call your primary care clinic.  If you do not have a primary care provider, please call [phone] and they will assist you.        Care EveryWhere ID     This is your Care EveryWhere ID. This could be used by other organizations to access your [facility] medical records  [id number]        Your Vitals Were     Pulse Temperature Pulse Oximetry BMI (Body Mass Index)          78 97.5  F (36.4  C) (Oral) 96% 35.15 kg/m2         Blood Pressure from Last 3 Encounters:   06/29/17 104/56   06/17/17 108/42    06/09/17 150/66    Weight from Last 3 Encounters:   06/29/17 180 lb (81.6 kg)   06/17/17 180 lb (81.6 kg)   12/22/16 182 lb 5.1 oz (82.7 kg)              Today, you had the following     No orders found for display         Today's Medication Changes          These changes are accurate as of: 6/29/17 11:59 PM.  If you have any questions, ask your nurse or doctor.               These medicines have changed or have updated prescriptions.        Dose/Directions    venlafaxine 150 MG 24 hr capsule   Commonly known as:  EFFEXOR-XR   This may have changed:    - medication strength  - how much to take   Used for:  Mild major depression (H)   Changed by:  Jeffery Zamora MD        Dose:  150 mg   Take 1 capsule (150 mg) by mouth daily   Quantity:  90 capsule   Refills:  3            Where to get your medicines      These medications were sent to [facility]/pharmacy #[id number] - [facility] [id number]  [address] [facility] [address]     Phone:  [phone]     venlafaxine 150 MG 24 hr capsule                Primary Care Provider Office Phone # Fax #    Jeffery Zamora [phone]       [facility] [address] [place] [address]        Equal Access to Services     LUDY MCCLURE AH: Hadii phuc ku hadasho Soomaali, waaxda luqadaha, qaybta kaalmada adeegyada, waxay idiin hayaan urmila kharagabrielle bernard. So [facility] [phone].    ATENCIÓN: Si habla español, tiene a hassan disposición servicios gratuitos de asistencia lingüística. Llame al [phone].    We comply with applicable federal civil rights laws and [place] laws. We do not discriminate on the basis of race, color, national origin, age, disability sex, sexual orientation or gender identity.            Thank you!     Thank you for choosing [facility]  for your care. Our goal is always to provide you with excellent care. Hearing back from our patients is one way we can continue to improve our  services. Please take a few minutes to complete the written survey that you may receive in the mail after your visit with us. Thank you!             Your Updated Medication List - Protect others around you: Learn how to safely use, store and throw away your medicines at www.disposemymeds.org.          This list is accurate as of: 6/29/17 11:59 PM.  Always use your most recent med list.                   Brand Name Dispense Instructions for use Diagnosis    acetaminophen 325 MG tablet    TYLENOL     Take 650 mg by mouth 3 times daily as needed for mild pain        alendronate 70 MG tablet    FOSAMAX          calcium carbonate-vitamin D 600-200 MG-UNIT Tabs      Take 1 tablet by mouth 2 times daily        cetirizine 10 MG tablet    zyrTEC     Take 10 mg by mouth daily        ELIQUIS PO      Take 5 mg by mouth 2 times daily        KEPPRA PO      Take 1,000 mg by mouth 2 times daily        * order for DME     1 Units    Equipment being ordered: Bedside commode    SDH (subdural hematoma) (H)       * order for DME     1 Device    Nebulizer with tubing    Pneumonia due to infectious organism, unspecified laterality, unspecified part of lung, Wheezing       potassium chloride SA 20 MEQ CR tablet    potassium chloride    180 tablet    Take 1 tablet (20 mEq) by mouth 2 times daily    Edema       senna-docusate 8.6-50 MG per tablet    SENOKOT-S;PERICOLACE     Take 2 tablets by mouth daily as needed for constipation        simvastatin 20 MG tablet    ZOCOR    90 tablet    Take 1 tablet (20 mg) by mouth At Bedtime    Hyperlipidemia LDL goal <130       venlafaxine 150 MG 24 hr capsule    EFFEXOR-XR    90 capsule    Take 1 capsule (150 mg) by mouth daily    Mild major depression (H)       vitamin E 400 UNITS Tabs     MONTH    qd    Unspecified essential hypertension       * Notice:  This list has 2 medication(s) that are the same as other medications prescribed for you. Read the directions carefully, and ask your doctor or other  care provider to review them with you.

## 2017-06-29 NOTE — PROGRESS NOTES
SUBJECTIVE:                                                    Cydney Bucio is a 76 year old female who presents to clinic today for the following health issues:      Patient presents today with some bleeding concerns. Patient is currently on Eliquis. Patient states she nicked her toe while trimming her toe nails. States it bleed for over 24 hours. Her and her  are concerned.         Problem list and histories reviewed & adjusted, as indicated.  Additional history: as documented    Bleeding lasted only several minutes before stopping.  She is on Eliquis for hx of CVA, with paroxysmal afib    Reviewed and updated as needed this visit by clinical staff       Reviewed and updated as needed this visit by Provider         ROS:  Constitutional, HEENT, cardiovascular, pulmonary, gi and gu systems are negative, except as otherwise noted.      OBJECTIVE:   /56 (BP Location: Left arm, Patient Position: Chair, Cuff Size: Adult Regular)  Pulse 78  Temp 97.5  F (36.4  C) (Oral)  Wt 180 lb (81.6 kg)  SpO2 96%  BMI 35.15 kg/m2  Body mass index is 35.15 kg/(m^2).  GENERAL: healthy, alert and no distress  NECK: no adenopathy, no asymmetry, masses, or scars and thyroid normal to palpation  RESP: lungs clear to auscultation - no rales, rhonchi or wheezes  CV: regular rate and rhythm, normal S1 S2, no S3 or S4, no murmur, click or rub, no peripheral edema and peripheral pulses strong  ABDOMEN: soft, nontender, no hepatosplenomegaly, no masses and bowel sounds normal  MS: no gross musculoskeletal defects noted, no edema.  Some onychpmycosis of toes, no active bleeding at site of injury        ASSESSMENT/PLAN:     1. Mild major depression (H)  Stable, well-controlled  - venlafaxine (EFFEXOR-XR) 150 MG 24 hr capsule; Take 1 capsule (150 mg) by mouth daily  Dispense: 90 capsule; Refill: 3    2. Toe injury, unspecified laterality, sequela  No need for further work-up, mild amount of excess bleeding expected in context of  Eliquis use.          Jeffery Zamora MD  [facility]

## 2017-09-05 ENCOUNTER — TRANSFERRED RECORDS (OUTPATIENT)
Dept: HEALTH INFORMATION MANAGEMENT | Facility: CLINIC | Age: 77
End: 2017-09-05

## 2017-09-15 DIAGNOSIS — E78.5 HYPERLIPIDEMIA LDL GOAL <130: ICD-10-CM

## 2017-09-15 RX ORDER — SIMVASTATIN 20 MG
TABLET ORAL
Qty: 90 TABLET | Refills: 0 | Status: SHIPPED | OUTPATIENT
Start: 2017-09-15 | End: 2017-12-14

## 2017-12-14 DIAGNOSIS — E78.5 HYPERLIPIDEMIA LDL GOAL <130: ICD-10-CM

## 2017-12-14 RX ORDER — SIMVASTATIN 20 MG
TABLET ORAL
Qty: 30 TABLET | Refills: 0 | Status: SHIPPED | OUTPATIENT
Start: 2017-12-14 | End: 2018-01-14

## 2017-12-14 NOTE — TELEPHONE ENCOUNTER
Medication is being filled for 1 time refill only due to:  Patient needs labs fasting. Letter sent.

## 2017-12-14 NOTE — LETTER
[facility]  [address] [address]  [phone]            Cydney Bucio  [address] [place] [address]        December 14, 2017    Dear Cydney,    While refilling your prescription today, we noticed that you are due to have labs drawn.  We will refill your prescription for 30 days, but a follow-up appointment must be made before any additional refills can be approved.     Taking care of your health is important to us and we look forward to seeing you in the near future.  Please call us at [phone] or [phone] (or use [employer]) to schedule an appointment.     Please disregard this notice if you have already made an appointment.    Sincerely,        [facility]

## 2018-01-01 ENCOUNTER — HEALTH MAINTENANCE LETTER (OUTPATIENT)
Age: 78
End: 2018-01-01

## 2018-01-01 ENCOUNTER — TRANSFERRED RECORDS (OUTPATIENT)
Dept: HEALTH INFORMATION MANAGEMENT | Facility: CLINIC | Age: 78
End: 2018-01-01

## 2018-01-01 ENCOUNTER — DOCUMENTATION ONLY (OUTPATIENT)
Dept: OTHER | Facility: CLINIC | Age: 78
End: 2018-01-01

## 2018-01-01 ENCOUNTER — APPOINTMENT (OUTPATIENT)
Dept: CT IMAGING | Facility: CLINIC | Age: 78
End: 2018-01-01
Attending: EMERGENCY MEDICINE
Payer: COMMERCIAL

## 2018-01-01 ENCOUNTER — MYC MEDICAL ADVICE (OUTPATIENT)
Dept: INTERNAL MEDICINE | Facility: CLINIC | Age: 78
End: 2018-01-01

## 2018-01-01 ENCOUNTER — OFFICE VISIT (OUTPATIENT)
Dept: INTERNAL MEDICINE | Facility: CLINIC | Age: 78
End: 2018-01-01
Payer: COMMERCIAL

## 2018-01-01 ENCOUNTER — HOSPITAL ENCOUNTER (EMERGENCY)
Facility: CLINIC | Age: 78
Discharge: HOME | End: 2018-04-06
Attending: EMERGENCY MEDICINE | Admitting: EMERGENCY MEDICINE
Payer: COMMERCIAL

## 2018-01-01 VITALS
DIASTOLIC BLOOD PRESSURE: 70 MMHG | HEART RATE: 81 BPM | BODY MASS INDEX: 37.11 KG/M2 | SYSTOLIC BLOOD PRESSURE: 120 MMHG | WEIGHT: 190 LBS | OXYGEN SATURATION: 93 % | RESPIRATION RATE: 16 BRPM | TEMPERATURE: 97.7 F

## 2018-01-01 VITALS
RESPIRATION RATE: 16 BRPM | SYSTOLIC BLOOD PRESSURE: 93 MMHG | DIASTOLIC BLOOD PRESSURE: 64 MMHG | TEMPERATURE: 98.6 F | WEIGHT: 202 LBS | BODY MASS INDEX: 39.45 KG/M2 | OXYGEN SATURATION: 95 %

## 2018-01-01 VITALS
HEART RATE: 72 BPM | BODY MASS INDEX: 35.87 KG/M2 | DIASTOLIC BLOOD PRESSURE: 70 MMHG | SYSTOLIC BLOOD PRESSURE: 120 MMHG | RESPIRATION RATE: 16 BRPM | WEIGHT: 190 LBS | HEIGHT: 61 IN | TEMPERATURE: 98 F

## 2018-01-01 VITALS
RESPIRATION RATE: 16 BRPM | OXYGEN SATURATION: 95 % | HEART RATE: 99 BPM | SYSTOLIC BLOOD PRESSURE: 130 MMHG | DIASTOLIC BLOOD PRESSURE: 80 MMHG | TEMPERATURE: 98.1 F

## 2018-01-01 DIAGNOSIS — F41.1 ANXIETY STATE: ICD-10-CM

## 2018-01-01 DIAGNOSIS — L89.151 PRESSURE ULCER OF COCCYGEAL REGION, STAGE 1: ICD-10-CM

## 2018-01-01 DIAGNOSIS — I67.89 ACUTE, BUT ILL-DEFINED, CEREBROVASCULAR DISEASE: Primary | ICD-10-CM

## 2018-01-01 DIAGNOSIS — Z98.890 S/P CRANIOTOMY: ICD-10-CM

## 2018-01-01 DIAGNOSIS — J98.19 PULMONARY COLLAPSE: ICD-10-CM

## 2018-01-01 DIAGNOSIS — Z79.01 CHRONIC ANTICOAGULATION: ICD-10-CM

## 2018-01-01 DIAGNOSIS — R07.9 ACUTE CHEST PAIN: ICD-10-CM

## 2018-01-01 DIAGNOSIS — E78.5 HYPERLIPIDEMIA LDL GOAL <130: ICD-10-CM

## 2018-01-01 DIAGNOSIS — E66.01 MORBID OBESITY (H): ICD-10-CM

## 2018-01-01 DIAGNOSIS — I10 ESSENTIAL HYPERTENSION: ICD-10-CM

## 2018-01-01 DIAGNOSIS — S06.5XAA SDH (SUBDURAL HEMATOMA) (H): ICD-10-CM

## 2018-01-01 DIAGNOSIS — F32.0 MILD MAJOR DEPRESSION (H): ICD-10-CM

## 2018-01-01 DIAGNOSIS — L89.152 PRESSURE ULCER OF COCCYGEAL REGION, STAGE 2 (H): Primary | ICD-10-CM

## 2018-01-01 DIAGNOSIS — Z86.73 HISTORY OF CVA (CEREBROVASCULAR ACCIDENT): Primary | ICD-10-CM

## 2018-01-01 DIAGNOSIS — I48.0 PAROXYSMAL ATRIAL FIBRILLATION (H): Primary | ICD-10-CM

## 2018-01-01 DIAGNOSIS — I48.0 PAROXYSMAL ATRIAL FIBRILLATION (H): ICD-10-CM

## 2018-01-01 LAB
ALBUMIN SERPL-MCNC: 3.3 G/DL (ref 3.4–5)
ALP SERPL-CCNC: 69 U/L (ref 40–150)
ALT SERPL W P-5'-P-CCNC: 17 U/L (ref 0–50)
ANION GAP SERPL CALCULATED.3IONS-SCNC: 7 MMOL/L (ref 3–14)
AST SERPL W P-5'-P-CCNC: 20 U/L (ref 0–45)
BASOPHILS # BLD AUTO: 0.1 10E9/L (ref 0–0.2)
BASOPHILS NFR BLD AUTO: 0.4 %
BILIRUB SERPL-MCNC: 0.7 MG/DL (ref 0.2–1.3)
BUN SERPL-MCNC: 20 MG/DL (ref 7–30)
CALCIUM SERPL-MCNC: 8.9 MG/DL (ref 8.5–10.1)
CHLORIDE SERPL-SCNC: 104 MMOL/L (ref 94–109)
CO2 SERPL-SCNC: 30 MMOL/L (ref 20–32)
CREAT SERPL-MCNC: 0.66 MG/DL (ref 0.52–1.04)
D DIMER PPP FEU-MCNC: 0.7 UG/ML FEU (ref 0–0.5)
DIFFERENTIAL METHOD BLD: ABNORMAL
EOSINOPHIL # BLD AUTO: 0.3 10E9/L (ref 0–0.7)
EOSINOPHIL NFR BLD AUTO: 1.9 %
ERYTHROCYTE [DISTWIDTH] IN BLOOD BY AUTOMATED COUNT: 14.7 % (ref 10–15)
GFR SERPL CREATININE-BSD FRML MDRD: 87 ML/MIN/1.7M2
GLUCOSE SERPL-MCNC: 105 MG/DL (ref 70–99)
HCT VFR BLD AUTO: 43.4 % (ref 35–47)
HGB BLD-MCNC: 13.8 G/DL (ref 11.7–15.7)
IMM GRANULOCYTES # BLD: 0.1 10E9/L (ref 0–0.4)
IMM GRANULOCYTES NFR BLD: 0.6 %
INTERPRETATION ECG - MUSE: NORMAL
LYMPHOCYTES # BLD AUTO: 1.8 10E9/L (ref 0.8–5.3)
LYMPHOCYTES NFR BLD AUTO: 12.8 %
MCH RBC QN AUTO: 30.9 PG (ref 26.5–33)
MCHC RBC AUTO-ENTMCNC: 31.8 G/DL (ref 31.5–36.5)
MCV RBC AUTO: 97 FL (ref 78–100)
MONOCYTES # BLD AUTO: 1.5 10E9/L (ref 0–1.3)
MONOCYTES NFR BLD AUTO: 10.9 %
NEUTROPHILS # BLD AUTO: 10.1 10E9/L (ref 1.6–8.3)
NEUTROPHILS NFR BLD AUTO: 73.4 %
NRBC # BLD AUTO: 0 10*3/UL
NRBC BLD AUTO-RTO: 0 /100
PLATELET # BLD AUTO: 197 10E9/L (ref 150–450)
POTASSIUM SERPL-SCNC: 3.4 MMOL/L (ref 3.4–5.3)
PROT SERPL-MCNC: 8.4 G/DL (ref 6.8–8.8)
RBC # BLD AUTO: 4.47 10E12/L (ref 3.8–5.2)
SODIUM SERPL-SCNC: 141 MMOL/L (ref 133–144)
TROPONIN I SERPL-MCNC: <0.015 UG/L (ref 0–0.04)
TSH SERPL DL<=0.005 MIU/L-ACNC: 1.5 MU/L (ref 0.4–4)
WBC # BLD AUTO: 13.8 10E9/L (ref 4–11)

## 2018-01-01 PROCEDURE — 93005 ELECTROCARDIOGRAM TRACING: CPT

## 2018-01-01 PROCEDURE — 99285 EMERGENCY DEPT VISIT HI MDM: CPT | Mod: 25

## 2018-01-01 PROCEDURE — 25000128 H RX IP 250 OP 636: Performed by: EMERGENCY MEDICINE

## 2018-01-01 PROCEDURE — 36415 COLL VENOUS BLD VENIPUNCTURE: CPT | Performed by: INTERNAL MEDICINE

## 2018-01-01 PROCEDURE — 99214 OFFICE O/P EST MOD 30 MIN: CPT | Performed by: INTERNAL MEDICINE

## 2018-01-01 PROCEDURE — 80053 COMPREHEN METABOLIC PANEL: CPT | Performed by: EMERGENCY MEDICINE

## 2018-01-01 PROCEDURE — 84484 ASSAY OF TROPONIN QUANT: CPT | Performed by: EMERGENCY MEDICINE

## 2018-01-01 PROCEDURE — 85379 FIBRIN DEGRADATION QUANT: CPT | Performed by: EMERGENCY MEDICINE

## 2018-01-01 PROCEDURE — 85025 COMPLETE CBC W/AUTO DIFF WBC: CPT | Performed by: EMERGENCY MEDICINE

## 2018-01-01 PROCEDURE — 84443 ASSAY THYROID STIM HORMONE: CPT | Performed by: INTERNAL MEDICINE

## 2018-01-01 PROCEDURE — 96374 THER/PROPH/DIAG INJ IV PUSH: CPT

## 2018-01-01 PROCEDURE — 71260 CT THORAX DX C+: CPT

## 2018-01-01 PROCEDURE — 99214 OFFICE O/P EST MOD 30 MIN: CPT | Performed by: PHYSICIAN ASSISTANT

## 2018-01-01 RX ORDER — METOPROLOL SUCCINATE 50 MG/1
50 TABLET, EXTENDED RELEASE ORAL DAILY
Qty: 30 TABLET | Refills: 3 | Status: SHIPPED | OUTPATIENT
Start: 2018-01-01 | End: 2018-01-01

## 2018-01-01 RX ORDER — SIMVASTATIN 20 MG
TABLET ORAL
Qty: 30 TABLET | Refills: 0 | Status: SHIPPED | OUTPATIENT
Start: 2018-01-01 | End: 2019-01-01

## 2018-01-01 RX ORDER — METOPROLOL SUCCINATE 50 MG/1
TABLET, EXTENDED RELEASE ORAL
Qty: 90 TABLET | Refills: 3 | Status: SHIPPED | OUTPATIENT
Start: 2018-01-01

## 2018-01-01 RX ORDER — METOPROLOL SUCCINATE 50 MG/1
50 TABLET, EXTENDED RELEASE ORAL DAILY
Qty: 90 TABLET | Refills: 0 | Status: SHIPPED | OUTPATIENT
Start: 2018-01-01 | End: 2018-01-01

## 2018-01-01 RX ORDER — VENLAFAXINE HYDROCHLORIDE 150 MG/1
CAPSULE, EXTENDED RELEASE ORAL
Qty: 90 CAPSULE | Refills: 3 | Status: SHIPPED | OUTPATIENT
Start: 2018-01-01

## 2018-01-01 RX ORDER — KETOROLAC TROMETHAMINE 15 MG/ML
15 INJECTION, SOLUTION INTRAMUSCULAR; INTRAVENOUS ONCE
Status: COMPLETED | OUTPATIENT
Start: 2018-01-01 | End: 2018-01-01

## 2018-01-01 RX ORDER — IOPAMIDOL 755 MG/ML
500 INJECTION, SOLUTION INTRAVASCULAR ONCE
Status: COMPLETED | OUTPATIENT
Start: 2018-01-01 | End: 2018-01-01

## 2018-01-01 RX ORDER — SIMVASTATIN 20 MG
TABLET ORAL
Qty: 30 TABLET | Refills: 5 | Status: SHIPPED | OUTPATIENT
Start: 2018-01-01 | End: 2018-01-01

## 2018-01-01 RX ORDER — AZITHROMYCIN 500 MG/1
500 TABLET, FILM COATED ORAL DAILY
Qty: 7 TABLET | Refills: 0 | Status: SHIPPED | OUTPATIENT
Start: 2018-01-01 | End: 2018-01-01

## 2018-01-01 RX ADMIN — IOPAMIDOL 79 ML: 755 INJECTION, SOLUTION INTRAVENOUS at 14:22

## 2018-01-01 RX ADMIN — KETOROLAC TROMETHAMINE 15 MG: 15 INJECTION, SOLUTION INTRAMUSCULAR; INTRAVENOUS at 14:00

## 2018-01-01 RX ADMIN — SODIUM CHLORIDE 98 ML: 9 INJECTION, SOLUTION INTRAVENOUS at 14:22

## 2018-01-01 ASSESSMENT — MIFFLIN-ST. JEOR: SCORE: 1279.21

## 2018-01-01 ASSESSMENT — ENCOUNTER SYMPTOMS: BACK PAIN: 1

## 2018-01-01 ASSESSMENT — PATIENT HEALTH QUESTIONNAIRE - PHQ9
SUM OF ALL RESPONSES TO PHQ QUESTIONS 1-9: 14
SUM OF ALL RESPONSES TO PHQ QUESTIONS 1-9: 10

## 2018-01-02 DIAGNOSIS — E78.5 HYPERLIPIDEMIA LDL GOAL <130: ICD-10-CM

## 2018-01-02 LAB
CHOLEST SERPL-MCNC: 153 MG/DL
HDLC SERPL-MCNC: 68 MG/DL
LDLC SERPL CALC-MCNC: 73 MG/DL
NONHDLC SERPL-MCNC: 85 MG/DL
TRIGL SERPL-MCNC: 59 MG/DL

## 2018-01-02 PROCEDURE — 80061 LIPID PANEL: CPT | Performed by: INTERNAL MEDICINE

## 2018-01-02 PROCEDURE — 36415 COLL VENOUS BLD VENIPUNCTURE: CPT | Performed by: INTERNAL MEDICINE

## 2018-01-14 DIAGNOSIS — E78.5 HYPERLIPIDEMIA LDL GOAL <130: ICD-10-CM

## 2018-01-14 NOTE — TELEPHONE ENCOUNTER
"Requested Prescriptions   Pending Prescriptions Disp Refills     simvastatin (ZOCOR) 20 MG tablet [Pharmacy Med Name: SIMVASTATIN 20 MG TABLET]  PATIENT NEEDS FASTING LABS.  Last Written Prescription Date:  12/14/17  Last Fill Quantity: 30 TABLET,  # refills: 0   Last Office Visit with [facility], [facility] or [facility] prescribing provider:  6/29/17   Future Office Visit:      30 tablet 0     Sig: TAKE 1 TABLET (20 MG) BY MOUTH AT BEDTIME. FASTING LABS NEEDED FOR FURTHER REFILLS.    Statins Protocol Passed    1/14/2018  1:19 AM       Passed - LDL on file in past 12 months    Recent Labs   Lab Test  01/02/18   1128   LDL  73            Passed - No abnormal creatine kinase in past 12 months    No lab results found.         Passed - Recent or future visit with authorizing provider    Patient had office visit in the last year or has a visit in the next 30 days with authorizing provider.  See \"Patient Info\" tab in inbasket, or \"Choose Columns\" in Meds & Orders section of the refill encounter.              Passed - Patient is age 18 or older       Passed - No active pregnancy on record       Passed - No positive pregnancy test in past 12 months          "

## 2018-01-16 RX ORDER — SIMVASTATIN 20 MG
20 TABLET ORAL AT BEDTIME
Qty: 30 TABLET | Refills: 4 | Status: SHIPPED | OUTPATIENT
Start: 2018-01-16 | End: 2018-01-01

## 2018-04-06 NOTE — ED PROVIDER NOTES
History     Chief Complaint:  Chest Pain      The history is provided by the patient.      Cydney Bucio is a 77 year old female with a history of stroke and HTN who presents to the ED for evaluation of chest pain and cough. The patient has had intermittent episodes of upper right chest pain since 4/4. She reports having radiating back pain. They are unsure of any exacerbating or alleviating factors and deny recent falls. She has taken tylenol with little relief. Her  reports that she complains of pain more at night.   Unclear if the pain is related to cough.  No fevers.  Burping at times.  No trauma.  Patient is cared for by her  and she is wheelchair-bound.  No diarrhea.  Not eating as much as normal.  Pain present with movement.  Patient denies any pain taking a deep breath.  Unclear aggravating or alleviating factors or how long the episodes of pain last.  Currently denies any chest pain.    Allergies:  No Known Allergies    Medications:    Zocor  Fosamax  Keppra  Eliquis  Zyrtec  Senokot  Potassium chloride    Past Medical History:    Acute, but ill-defined, cerebrovascular disease   Allergic rhinitis due to other allergen   Aphasia due to stroke (H)   Coronary artery disease   Depressive disorder, not elsewhere classified   Edema   History of seizure   HYPERTENSION NOS   Osteoporosis, unspecified   Other and unspecified hyperlipidemia   SDH (subdural hematoma) (H)   Unspecified cerebral artery occlusion with cerebral infarction    Past Surgical History:    Vertarra fused lower back  Hymenotomy  Tonsillectomy  D&C  Laminectomy  Craniotomy  Orthopedic    Family History:    Mother: MI, uterine cancer, DM, CAD  Father: CHF, multiple MI, AAA, colon cancer, colorectal cancer  Sister:osteoarthritis    Social History:  Presents with her .   Negative for tobacco use.  Negative for smokeless tobacco.   Negative for alcohol use.  Marital Status:   [2]     Review of Systems   Cardiovascular:  Positive for chest pain.   Musculoskeletal: Positive for back pain.   All other systems reviewed and are negative.    Pain in upper right chest on Wednesday with radiation to the back.    Unclear about aggravating or alleviating factors.    Physical Exam   First Vitals:  BP: 164/78  Heart Rate: 108  Temp: 98.6  F (37  C)  Resp: 16  Weight: 91.6 kg (202 lb)  SpO2: (!) 89 %  BP 93/64  Temp 98.6  F (37  C)  Resp 16  Wt 91.6 kg (202 lb)  SpO2 95%  BMI 39.45 kg/m2  Recheck of sats on another finger 95% on room air    Physical Exam  GEN: patient smiling, no distress,  at the bedside  HEAD: atraumatic, normocephalic  EYES: pupils reactive, conjunctivae normal  ENT: TMs flat and white bilaterally, oropharynx normal with no erythema or exudate, mucus membranes moist  NECK: no cervical LAD, no meningeal signs, trachea midline, no JVD  RESPIRATORY: no tachypnea, breath sounds clear to auscultation (no rales, wheezes, rhonchi), chest wall nontender, normal phonation  CVS: normal S1/S2, I/VI systolic ej murmurs, no rubs/gallops  ABDOMEN: soft, nontender, no masses or organomegaly, no rebound, decreased bowel sounds  BACK: no spinal tenderness  EXTREMITIES: intact pulses x 4, full range of motion at joints, no edema  MUSCULOSKELETAL: patient's right arm held in flexion.  Contractures  SKIN: warm and dry  NEURO: GCS 15, cranial nerves intact.  Motor- moves all 4 extremities with 5/5 strength, right arm and leg with contractures.  Sensation- intact Coordination- not ambulatory, patient in wheelchair.  Overall symmetrical exam  HEME: no bruising      Emergency Department Course   ECG:  Indication: Chest pain   Time: 1052  Vent. Rate 108 bpm. NC interval 148. QRS duration 86. QT/QTc 338/452. P-R-T axis 57 -10 57. Sinus tachycardia. LAD. Otherwise normal ECG. Read time: 1057.  No STEMI    Imaging:  Radiographic findings were communicated with the patient who voiced understanding of the findings.      CT Chest, IV contrast  only-PE protocol:   IMPRESSION: I see no significant change since a CT on 3/13/2015. No  pulmonary embolism is seen. There is mild dependent atelectasis of  both lung bases with small bilateral pleural effusions. As per radiology.    Laboratory:  1137 Troponin: <0.015  D dimer: 0.7 (H)    CBC: WBC: 13.8 (H), HGB: 13.8, PLT: 197  CMP: Glucose 105 (H), albumin 3.3 (L), o/w WNL (Creatinine: 0.66)    Interventions:  1400 Toradol 15 mg IV  Heplock  Cardiac/Sp02 monitoring  500cc NS bolus IV    Emergency Department Course:  Nursing notes and vitals reviewed. 1137 I performed an exam of the patient as documented above. EKG obtained in the ED, see results above.     IV inserted. Medicine administered as documented above. Blood drawn. This was sent to the lab for further testing, results above.    The patient was sent for a CT Chest while in the emergency department, findings above.     3:14 PM recheck    1525 I rechecked the patient and discussed the results of her workup thus far.     Findings and plan explained to the Patient. Patient discharged home with instructions regarding supportive care, medications, and reasons to return. The importance of close follow-up was reviewed. The patient was prescribed Zithromax.    I personally reviewed the laboratory results with the Patient and answered all related questions prior to discharge.     BP 93/64  Temp 98.6  F (37  C)  Resp 16  Wt 91.6 kg (202 lb)  SpO2 95%  BMI 39.45 kg/m2  Discussed results with patient.  Gave patient copies of results (applicable labs, CT scans and/or ultrasound).  Answered questions.  Asked patient to followup with PCP.    Patient tolerated a PO challenge prior to d/c.    Impression & Plan      Medical Decision Making:  Cydney Bucio is a 77 year old female presents with left-sided chest discomfort.  It has been hurting over the last couple of days and there are unclear aggravating or alleviating factors.  The patient is not the best of historian and  the  supplements her history.  Initial sats were low at 89% but recheck on another finger relieves sats of 95% on room air.  EKG did not show any evidence of ST elevation myocardial infarction.   IV was placed and labs were sent.   The patient's troponin was also negative.  Her chemistry panel and CBC shows elevated white cell count which has been present in the past, but this has otherwise been unremarkable.  Unfortunately, her d-dimer is elevated.  She was taken for a CT scan of the chest, which shows no evidence of PE, but there is mild dependent atelectasis in both lung bases with small bilateral pleural effusions.  Patient and  are aware of the findings and wish to be d/c home vs being admitted.  Patient is given an incentive spirometer.    We are sending the patient home with Zithromax in addition to copies of her studies and incentive spirometer that she can use to exercise.  Patient feels comfortable going home and her and her  are updated.  There is no evidence of hypoxia or tachycardia and I think the patient is stable for discharge.  Zithromax for 7 days.  She will follow-up with her primary care Monday or Tuesday.   She is given an incentive spirometer and she understands the risks reasons to return.   Incentive spirometer was given to the patient and instructions on operation.    Diagnosis:  1. Atelectasis  2. Acute chest pain    ICD-10-CM       Disposition:  discharged to home    Discharge Medications:  New Prescriptions    AZITHROMYCIN (ZITHROMAX) 500 MG TABLET    Take 1 tablet (500 mg) by mouth daily for 7 days     Instructions to patient:  Incentive spirometer every 1hour while awake.  Reasons to return: chest pain, shortness of breath, nausea/vomiting, bleeding, confusion, blood in stools, dizziness, passing out, increasing headache, weakness, inability to walk.  Also return if cough, difficulty breathing, nausea/vomiting, confusion, or any other problems.    Please followup  with your doctor in 1-3 days.  Come back if not better.    Take all the antibiotics.    I, Symone Rosas, am serving as a scribe on 4/6/2018 at 11:37 AM to personally document services performed by Melly Bravo MD based on my observations and the provider's statements to me.       Symone Rosas  4/6/2018   [facility] EMERGENCY DEPARTMENT       Melly Bravo MD  04/07/18 1142

## 2018-04-06 NOTE — ED AVS SNAPSHOT
[facility] Emergency Department    [address]    [facility] [address]    Phone:  [phone]    Fax:  [phone]                                       Cydney Bucio   MRN: [id number]    Department:  [facility] Emergency Department   Date of Visit:  4/6/2018           After Visit Summary Signature Page     I have received my discharge instructions, and my questions have been answered. I have discussed any challenges I see with this plan with the nurse or doctor.    ..........................................................................................................................................  Patient/Patient Representative Signature      ..........................................................................................................................................  Patient Representative Print Name and Relationship to Patient    ..................................................               ................................................  Date                                            Time    ..........................................................................................................................................  Reviewed by Signature/Title    ...................................................              ..............................................  Date                                                            Time

## 2018-04-06 NOTE — ED AVS SNAPSHOT
[facility] Emergency Department    [address]    [facility] [address]    Phone:  [phone]    Fax:  [phone]                                       Cydney Bucio   MRN: [id number]    Department:  [facility] Emergency Department   Date of Visit:  4/6/2018           Patient Information     Date Of Birth          1940        Your diagnoses for this visit were:     Pulmonary collapse     Acute chest pain        You were seen by Melly Bravo MD.      Follow-up Information     Follow up with Jeffery Zamora MD.    Specialty:  Internal Medicine    Contact information:    [address] [place] [address]  [phone]        Discharge References/Attachments     CHEST PAIN, NONCARDIAC  (ENGLISH)    ATELECTASIS (ENGLISH)      24 Hour Appointment Hotline       To make an appointment at any [facility] clinic, call [phone] ([phone]). If you don't have a family doctor or clinic, we will help you find one. [facility] clinics are conveniently located to serve the needs of you and your family.             Review of your medicines      START taking        Dose / Directions Last dose taken    azithromycin 500 MG tablet   Commonly known as:  ZITHROMAX   Dose:  500 mg   Quantity:  7 tablet        Take 1 tablet (500 mg) by mouth daily for 7 days   Refills:  0          Our records show that you are taking the medicines listed below. If these are incorrect, please call your family doctor or clinic.        Dose / Directions Last dose taken    acetaminophen 325 MG tablet   Commonly known as:  TYLENOL   Dose:  650 mg        Take 650 mg by mouth 3 times daily as needed for mild pain   Refills:  0        alendronate 70 MG tablet   Commonly known as:  FOSAMAX        Refills:  0        calcium carbonate-vitamin D 600-200 MG-UNIT Tabs   Dose:  1 tablet        Take 1 tablet by mouth 2 times daily   Refills:  0        cetirizine 10 MG tablet   Commonly known as:  zyrTEC    Dose:  10 mg        Take 10 mg by mouth daily   Refills:  0        ELIQUIS PO   Dose:  5 mg        Take 5 mg by mouth 2 times daily   Refills:  0        KEPPRA PO   Dose:  1000 mg        Take 1,000 mg by mouth 2 times daily   Refills:  0        * order for DME   Quantity:  1 Units        Equipment being ordered: Bedside commode   Refills:  0        * order for DME   Quantity:  1 Device        Nebulizer with tubing   Refills:  0        potassium chloride SA 20 MEQ CR tablet   Commonly known as:  KLOR-CON   Dose:  20 mEq   Quantity:  180 tablet        Take 1 tablet (20 mEq) by mouth 2 times daily   Refills:  0        senna-docusate 8.6-50 MG per tablet   Commonly known as:  SENOKOT-S;PERICOLACE   Dose:  2 tablet        Take 2 tablets by mouth daily as needed for constipation   Refills:  0        simvastatin 20 MG tablet   Commonly known as:  ZOCOR   Dose:  20 mg   Quantity:  30 tablet        Take 1 tablet (20 mg) by mouth At Bedtime   Refills:  4        venlafaxine 150 MG 24 hr capsule   Commonly known as:  EFFEXOR-XR   Dose:  150 mg   Quantity:  90 capsule        Take 1 capsule (150 mg) by mouth daily   Refills:  3        vitamin E 400 UNITS Tabs   Quantity:  MONTH        qd   Refills:  11        * Notice:  This list has 2 medication(s) that are the same as other medications prescribed for you. Read the directions carefully, and ask your doctor or other care provider to review them with you.            Prescriptions were sent or printed at these locations (1 Prescription)                   Other Prescriptions                Printed at Department/Unit printer (1 of 1)         azithromycin (ZITHROMAX) 500 MG tablet                Procedures and tests performed during your visit     CBC with platelets differential    Chest CT, IV contrast only - PE protocol    Comprehensive metabolic panel    D dimer quantitative    EKG 12 lead    EKG 12-lead, tracing only    Peripheral IV catheter    Troponin I      Orders Needing  Specimen Collection     None      Pending Results     No orders found from 4/4/2018 to 4/7/2018.            Pending Culture Results     No orders found from 4/4/2018 to 4/7/2018.            Pending Results Instructions     If you had any lab results that were not finalized at the time of your Discharge, you can call the ED Lab Result RN at [phone]. You will be contacted by this team for any positive Lab results or changes in treatment. The nurses are available 7 days a week from 10A to 6:30P.  You can leave a message 24 hours per day and they will return your call.        Test Results From Your Hospital Stay        4/6/2018 12:24 PM      Component Results     Component Value Ref Range & Units Status    WBC 13.8 (H) 4.0 - 11.0 10e9/L Final    RBC Count 4.47 3.8 - 5.2 10e12/L Final    Hemoglobin 13.8 11.7 - 15.7 g/dL Final    Hematocrit 43.4 35.0 - 47.0 % Final    MCV 97 78 - 100 fl Final    MCH 30.9 26.5 - 33.0 pg Final    MCHC 31.8 31.5 - 36.5 g/dL Final    RDW 14.7 10.0 - 15.0 % Final    Platelet Count 197 150 - 450 10e9/L Final    Diff Method Automated Method  Final    % Neutrophils 73.4 % Final    % Lymphocytes 12.8 % Final    % Monocytes 10.9 % Final    % Eosinophils 1.9 % Final    % Basophils 0.4 % Final    % Immature Granulocytes 0.6 % Final    Nucleated RBCs 0 0 /100 Final    Absolute Neutrophil 10.1 (H) 1.6 - 8.3 10e9/L Final    Absolute Lymphocytes 1.8 0.8 - 5.3 10e9/L Final    Absolute Monocytes 1.5 (H) 0.0 - 1.3 10e9/L Final    Absolute Eosinophils 0.3 0.0 - 0.7 10e9/L Final    Absolute Basophils 0.1 0.0 - 0.2 10e9/L Final    Abs Immature Granulocytes 0.1 0 - 0.4 10e9/L Final    Absolute Nucleated RBC 0.0  Final         4/6/2018 12:57 PM      Component Results     Component Value Ref Range & Units Status    Sodium 141 133 - 144 mmol/L Final    Potassium 3.4 3.4 - 5.3 mmol/L Final    Chloride 104 94 - 109 mmol/L Final    Carbon Dioxide 30 20 - 32 mmol/L Final    Anion Gap 7 3 - 14 mmol/L Final     Glucose 105 (H) 70 - 99 mg/dL Final    Urea Nitrogen 20 7 - 30 mg/dL Final    Creatinine 0.66 0.52 - 1.04 mg/dL Final    GFR Estimate 87 >60 mL/min/1.7m2 Final    Non  GFR Calc    GFR Estimate If Black >90 >60 mL/min/1.7m2 Final    African American GFR Calc    Calcium 8.9 8.5 - 10.1 mg/dL Final    Bilirubin Total 0.7 0.2 - 1.3 mg/dL Final    Albumin 3.3 (L) 3.4 - 5.0 g/dL Final    Protein Total 8.4 6.8 - 8.8 g/dL Final    Alkaline Phosphatase 69 40 - 150 U/L Final    ALT 17 0 - 50 U/L Final    AST 20 0 - 45 U/L Final         4/6/2018 12:57 PM      Component Results     Component Value Ref Range & Units Status    Troponin I ES <0.015 0.000 - 0.045 ug/L Final    The 99th percentile for upper reference range is 0.045 ug/L.  Troponin values   in the range of 0.045 - 0.120 ug/L may be associated with risks of adverse   clinical events.           4/6/2018  1:07 PM      Component Results     Component Value Ref Range & Units Status    D Dimer 0.7 (H) 0.0 - 0.50 ug/ml FEU Final    This D-dimer assay is intended for use in conjunction with a clinical pretest   probability assessment model to exclude pulmonary embolism (PE) and deep   venous thrombosis (DVT) in outpatients suspected of PE or DVT. The cut-off   value is 0.5 ug/mL FEU.           4/6/2018  3:04 PM      Narrative     CT CHEST WITH CONTRAST  4/6/2018 2:32 PM    HISTORY: Chest pain. Evaluate for pulmonary embolism.    COMPARISON: A CT on 3/13/2015.    TECHNIQUE: Routine transverse CT imaging of the chest was performed  following the uneventful intravenous administration of 79 mL  Isovue-370 contrast. A pulmonary embolism protocol was utilized.  Radiation dose for this scan was reduced using automated exposure  control, adjustment of the mA and/or kV according to patient size, or  iterative reconstruction technique.    FINDINGS: The heart size is normal. Again seen are several nonenlarged  mediastinal lymph nodes. No definite enlarged lymph node or  other  abnormal mass is seen. There is calcification of the thoracic aorta.  There is very good opacification of the pulmonary arteries with  contrast. No pulmonary embolism is seen. There is mild dependent  atelectasis in both lung bases. The lungs are otherwise clear. No  pneumothorax is demonstrated. There are small bilateral pleural  effusions. There are degenerative changes in the spine. No other  osseous abnormality is seen. No chest wall pathology is seen. The  visualized upper abdomen is unremarkable.        Impression     IMPRESSION: I see no significant change since a CT on 3/13/2015. No  pulmonary embolism is seen. There is mild dependent atelectasis of  both lung bases with small bilateral pleural effusions.    YULIA FITZGERALD MD                Clinical Quality Measure: Blood Pressure Screening     Your blood pressure was checked while you were in the emergency department today. The last reading we obtained was  BP: 101/74 . Please read the guidelines below about what these numbers mean and what you should do about them.  If your systolic blood pressure (the top number) is less than 120 and your diastolic blood pressure (the bottom number) is less than 80, then your blood pressure is normal. There is nothing more that you need to do about it.  If your systolic blood pressure (the top number) is 120-139 or your diastolic blood pressure (the bottom number) is 80-89, your blood pressure may be higher than it should be. You should have your blood pressure rechecked within a year by a primary care provider.  If your systolic blood pressure (the top number) is 140 or greater or your diastolic blood pressure (the bottom number) is 90 or greater, you may have high blood pressure. High blood pressure is treatable, but if left untreated over time it can put you at risk for heart attack, stroke, or kidney failure. You should have your blood pressure rechecked by a primary care provider within the next 4  weeks.  If your provider in the emergency department today gave you specific instructions to follow-up with your doctor or provider even sooner than that, you should follow that instruction and not wait for up to 4 weeks for your follow-up visit.        Thank you for choosing [facility]       Thank you for choosing [facility] for your care. Our goal is always to provide you with excellent care. Hearing back from our patients is one way we can continue to improve our services. Please take a few minutes to complete the written survey that you may receive in the mail after you visit with us. Thank you!        [employer] Information     [employer] gives you secure access to your electronic health record. If you see a primary care provider, you can also send messages to your care team and make appointments. If you have questions, please call your primary care clinic.  If you do not have a primary care provider, please call [phone] and they will assist you.        Care EveryWhere ID     This is your Care EveryWhere ID. This could be used by other organizations to access your [facility] medical records  [id number]        Equal Access to Services     LUDY MCCLURE : Hadii phuc odonnello Somarie, waaxda luqadaha, qaybta kaalmada dayanara, becki mcintosh . [facility] [phone].    ATENCIÓN: Si habla español, tiene a hassan disposición servicios gratuitos de asistencia lingüística. Llame al [phone].    We comply with applicable federal civil rights laws and [place] laws. We do not discriminate on the basis of race, color, national origin, age, disability, sex, sexual orientation, or gender identity.            After Visit Summary       This is your record. Keep this with you and show to your community pharmacist(s) and doctor(s) at your next visit.

## 2018-05-02 NOTE — PROGRESS NOTES
SUBJECTIVE:   Cydney Bucio is a 77 year old female who presents to clinic today for the following health issues:      ED/UC Followup:    Facility:  [facility] ER  Date of visit: 4/6/18  Reason for visit: Chest pain/pulmary collapse  Current Status: Low energy, difficult breathing with hyperventilates, discomfort in chest     Concerned on rash on right arm, and a little bit on the elbow along with upper arm        Problem list and histories reviewed & adjusted, as indicated.  Additional history:     In the ER, exam was unremarkable, chest CT was unrevealing, labs showed no evidence of anemia, acid-base imbalance, or obvious infection.    She continues to have episodes of feeling the need to breathe rapidly which is quite uncomfortable.  EKG in the ER was also remarkable only for sinus tachycardia.  Her resting heart rate has been higher recently, according to her .    He continues on medical therapy for atrial fibrillation, with history of CVA.    Reviewed and updated as needed this visit by clinical staff  Tobacco  Allergies  Meds       Reviewed and updated as needed this visit by Provider         ROS:  Constitutional, HEENT, cardiovascular, pulmonary, GI, , musculoskeletal, neuro, skin, endocrine and psych systems are negative, except as otherwise noted.    OBJECTIVE:     /80  Pulse 99  Temp 98.1  F (36.7  C) (Oral)  Resp 16  SpO2 95%  There is no height or weight on file to calculate BMI.  GENERAL: healthy, alert and no distress  NECK: no adenopathy, no asymmetry, masses, or scars and thyroid normal to palpation  RESP: lungs clear to auscultation - no rales, rhonchi or wheezes  CV: regular rate and rhythm, normal S1 S2, no S3 or S4, no murmur, click or rub, no peripheral edema and peripheral pulses strong  ABDOMEN: soft, nontender, no hepatosplenomegaly, no masses and bowel sounds normal  MS: no gross musculoskeletal defects noted, no edema        ASSESSMENT/PLAN:     1. Acute, but  ill-defined, cerebrovascular disease        2. Anxiety state  I suspect that many of her symptoms are due to periods of anxiety.  Trial of low-dose beta blocker as below  - TSH with free T4 reflex    4. Essential hypertension  Start toprol XL, given above symptoms, and BP which could still be better controlled  - metoprolol succinate (TOPROL-XL) 50 MG 24 hr tablet; Take 1 tablet (50 mg) by mouth daily  Dispense: 30 tablet; Refill: 3        Jeffery Zamora MD  [facility]

## 2018-05-02 NOTE — PATIENT INSTRUCTIONS
- Start taking Toprol XL once daily.  (Prescription has been sent to your pharmacy)    - Continue all other medications as you have been.       - I will contact you with lab results from today.

## 2018-05-02 NOTE — LETTER
My Depression Action Plan  Name: Cydney Bucio   Date of Birth 1940  Date: 5/2/2018    My doctor: Jeffery Zamora   My clinic: [address] [address] [phone]          GREEN    ZONE   Good Control    What it looks like:     Things are going generally well. You have normal up s and down s. You may even feel depressed from time to time, but bad moods usually last less than a day.   What you need to do:  1. Continue to care for yourself (see self care plan)  2. Check your depression survival kit and update it as needed  3. Follow your physician s recommendations including any medication.  4. Do not stop taking medication unless you consult with your physician first.           YELLOW         ZONE Getting Worse    What it looks like:     Depression is starting to interfere with your life.     It may be hard to get out of bed; you may be starting to isolate yourself from others.    Symptoms of depression are starting to last most all day and this has happened for several days.     You may have suicidal thoughts but they are not constant.   What you need to do:     1. Call your care team, your response to treatment will improve if you keep your care team informed of your progress. Yellow periods are signs an adjustment may need to be made.     2. Continue your self-care, even if you have to fake it!    3. Talk to someone in your support network    4. Open up your depression survival kit           RED    ZONE Medical Alert - Get Help    What it looks like:     Depression is seriously interfering with your life.     You may experience these or other symptoms: You can t get out of bed most days, can t work or engage in other necessary activities, you have trouble taking care of basic hygiene, or basic responsibilities, thoughts of suicide or death that will not go away, self-injurious behavior.     What you need to do:  1. Call your care  team and request a same-day appointment. If they are not available (weekends or after hours) call your local crisis line, emergency room or 911.            Depression Self Care Plan / Survival Kit    Self-Care for Depression  Here s the deal. Your body and mind are really not as separate as most people think.  What you do and think affects how you feel and how you feel influences what you do and think. This means if you do things that people who feel good do, it will help you feel better.  Sometimes this is all it takes.  There is also a place for medication and therapy depending on how severe your depression is, so be sure to consult with your medical provider and/ or Behavioral Health Consultant if your symptoms are worsening or not improving.     In order to better manage my stress, I will:    Exercise  Get some form of exercise, every day. This will help reduce pain and release endorphins, the  feel good  chemicals in your brain. This is almost as good as taking antidepressants!  This is not the same as joining a gym and then never going! (they count on that by the way ) It can be as simple as just going for a walk or doing some gardening, anything that will get you moving.      Hygiene   Maintain good hygiene (Get out of bed in the morning, Make your bed, Brush your teeth, Take a shower, and Get dressed like you were going to work, even if you are unemployed).  If your clothes don't fit try to get ones that do.    Diet  I will strive to eat foods that are good for me, drink plenty of water, and avoid excessive sugar, caffeine, alcohol, and other mood-altering substances.  Some foods that are helpful in depression are: complex carbohydrates, B vitamins, flaxseed, fish or fish oil, fresh fruits and vegetables.    Psychotherapy  I agree to participate in Individual Therapy (if recommended).    Medication  If prescribed medications, I agree to take them.  Missing doses can result in serious side effects.  I  understand that drinking alcohol, or other illicit drug use, may cause potential side effects.  I will not stop my medication abruptly without first discussing it with my provider.    Staying Connected With Others  I will stay in touch with my friends, family members, and my primary care provider/team.    Use your imagination  Be creative.  We all have a creative side; it doesn t matter if it s oil painting, sand castles, or mud pies! This will also kick up the endorphins.    Witness Beauty  (AKA stop and smell the roses) Take a look outside, even in mid-winter. Notice colors, textures. Watch the squirrels and birds.     Service to others  Be of service to others.  There is always someone else in need.  By helping others we can  get out of ourselves  and remember the really important things.  This also provides opportunities for practicing all the other parts of the program.    Humor  Laugh and be silly!  Adjust your TV habits for less news and crime-drama and more comedy.    Control your stress  Try breathing deep, massage therapy, biofeedback, and meditation. Find time to relax each day.     My support system    Clinic Contact:  Phone number:    Contact 1:  Phone number:    Contact 2:  Phone number:    Voodoo/:  Phone number:    Therapist:  Phone number:    Local crisis center:    Phone number:    Other community support:  Phone number:

## 2018-05-02 NOTE — MR AVS SNAPSHOT
After Visit Summary   5/2/2018    Cydney Bucio    MRN: [id number]           Patient Information     Date Of Birth          1940        Visit Information        Provider Department      5/2/2018 2:30 PM Jeffery Zamora MD [facility]        Today's Diagnoses     Screen for colon cancer    -  1    Anxiety state        Acute, but ill-defined, cerebrovascular disease        Essential hypertension          Care Instructions    - Start taking Toprol XL once daily.  (Prescription has been sent to your pharmacy)    - Continue all other medications as you have been.       - I will contact you with lab results from today.           Follow-ups after your visit        Who to contact     If you have questions or need follow up information about today's clinic visit or your schedule please contact [facility] directly at [phone].  Normal or non-critical lab and imaging results will be communicated to you by MyChart, letter or phone within 4 business days after the clinic has received the results. If you do not hear from us within 7 days, please contact the clinic through MyChart or phone. If you have a critical or abnormal lab result, we will notify you by phone as soon as possible.  Submit refill requests through [employer] or call your pharmacy and they will forward the refill request to us. Please allow 3 business days for your refill to be completed.          Additional Information About Your Visit        MyChart Information     [employer] gives you secure access to your electronic health record. If you see a primary care provider, you can also send messages to your care team and make appointments. If you have questions, please call your primary care clinic.  If you do not have a primary care provider, please call [phone] and they will assist you.        Care EveryWhere ID     This is your Care EveryWhere ID. This could be used by other organizations  to access your [place] medical records  [id number]        Your Vitals Were     Pulse Temperature Respirations Pulse Oximetry          99 98.1  F (36.7  C) (Oral) 16 95%         Blood Pressure from Last 3 Encounters:   05/02/18 130/80   04/06/18 93/64   06/29/17 104/56    Weight from Last 3 Encounters:   04/06/18 202 lb (91.6 kg)   06/29/17 180 lb (81.6 kg)   06/17/17 180 lb (81.6 kg)              We Performed the Following     TSH with free T4 reflex          Today's Medication Changes          These changes are accurate as of 5/2/18  3:12 PM.  If you have any questions, ask your nurse or doctor.               Start taking these medicines.        Dose/Directions    metoprolol succinate 50 MG 24 hr tablet   Commonly known as:  TOPROL-XL   Used for:  Essential hypertension   Started by:  Jeffery Zamora MD        Dose:  50 mg   Take 1 tablet (50 mg) by mouth daily   Quantity:  30 tablet   Refills:  3            Where to get your medicines      These medications were sent to [facility]/pharmacy #[id number] - [facility] [address] [address] [employer] [facility]  [address] [address] [facility] [address]     Phone:  [phone]     metoprolol succinate 50 MG 24 hr tablet                Primary Care Provider Office Phone # Fax #    Jeffery Zamora [phone] [address] [place] [address]        Equal Access to Services     JOHN MCCLURE AH: Hadii phuc ku hadasho Sobrendaali, waaxda luqadaha, qaybta kaalmada adeclementinayada, waxyosef reymundo bernard. So [facility] [phone].    ATENCIÓN: Si habla español, tiene a hassan disposición servicios gratuitos de asistencia lingüística. Llame al [phone].    We comply with applicable federal civil rights laws and [place] laws. We do not discriminate on the basis of race, color, national origin, age, disability, sex, sexual orientation, or gender identity.            Thank you!     Thank you for choosing [facility]  for your care. Our  goal is always to provide you with excellent care. Hearing back from our patients is one way we can continue to improve our services. Please take a few minutes to complete the written survey that you may receive in the mail after your visit with us. Thank you!             Your Updated Medication List - Protect others around you: Learn how to safely use, store and throw away your medicines at www.disposemymeds.org.          This list is accurate as of 5/2/18  3:12 PM.  Always use your most recent med list.                   Brand Name Dispense Instructions for use Diagnosis    acetaminophen 325 MG tablet    TYLENOL     Take 650 mg by mouth 3 times daily as needed for mild pain        alendronate 70 MG tablet    FOSAMAX          calcium carbonate-vitamin D 600-200 MG-UNIT Tabs      Take 1 tablet by mouth 2 times daily        cetirizine 10 MG tablet    zyrTEC     Take 10 mg by mouth daily        ELIQUIS PO      Take 5 mg by mouth 2 times daily        KEPPRA PO      Take 1,000 mg by mouth 2 times daily        metoprolol succinate 50 MG 24 hr tablet    TOPROL-XL    30 tablet    Take 1 tablet (50 mg) by mouth daily    Essential hypertension       potassium chloride SA 20 MEQ CR tablet    KLOR-CON    180 tablet    Take 1 tablet (20 mEq) by mouth 2 times daily    Edema       senna-docusate 8.6-50 MG per tablet    SENOKOT-S;PERICOLACE     Take 2 tablets by mouth daily as needed for constipation        simvastatin 20 MG tablet    ZOCOR    30 tablet    Take 1 tablet (20 mg) by mouth At Bedtime    Hyperlipidemia LDL goal <130       venlafaxine 150 MG 24 hr capsule    EFFEXOR-XR    90 capsule    Take 1 capsule (150 mg) by mouth daily    Mild major depression (H)       vitamin E 400 units Tabs     MONTH    qd    Unspecified essential hypertension

## 2018-06-23 NOTE — TELEPHONE ENCOUNTER
"Requested Prescriptions   Pending Prescriptions Disp Refills     metoprolol succinate (TOPROL-XL) 50 MG 24 hr tablet  PATIENT REQUESTING A 90 DAY SUPPLY.  Last Written Prescription Date:  5/2/18  Last Fill Quantity: 30 TABLET,  # refills: 3   Last office visit: 5/2/2018 with prescribing provider:  LENNIE   Future Office Visit:     30 tablet 3     Sig: Take 1 tablet (50 mg) by mouth daily    Beta-Blockers Protocol Passed    6/23/2018  2:49 PM       Passed - Blood pressure under 140/90 in past 12 months    BP Readings from Last 3 Encounters:   05/02/18 130/80   04/06/18 93/64   06/29/17 104/56                Passed - Patient is age 6 or older       Passed - Recent (12 mo) or future (30 days) visit within the authorizing provider's specialty    Patient had office visit in the last 12 months or has a visit in the next 30 days with authorizing provider or within the authorizing provider's specialty.  See \"Patient Info\" tab in inbasket, or \"Choose Columns\" in Meds & Orders section of the refill encounter.              "

## 2018-06-26 NOTE — TELEPHONE ENCOUNTER
"Requested Prescriptions   Pending Prescriptions Disp Refills     venlafaxine (EFFEXOR-XR) 150 MG 24 hr capsule [Pharmacy Med Name: VENLAFAXINE HCL  MG CAP] 90 capsule 3     Sig: TAKE 1 CAPSULE (150 MG) BY MOUTH DAILY    Serotonin-Norepinephrine Reuptake Inhibitors  Failed    6/25/2018  5:53 PM       Failed - PHQ-9 score of less than 5 in past 6 months    Please review last PHQ-9 score.          Passed - Blood pressure under 140/90 in past 12 months    BP Readings from Last 3 Encounters:   05/02/18 130/80   04/06/18 93/64   06/29/17 104/56                Passed - Patient is age 18 or older       Passed - No active pregnancy on record       Passed - Normal serum creatinine on file in past 12 months    Recent Labs   Lab Test  04/06/18   1137   CR  0.66            Passed - No positive pregnancy test in past 12 months       Passed - Recent (6 mo) or future (30 days) visit within the authorizing provider's specialty    Patient had office visit in the last 6 months or has a visit in the next 30 days with authorizing provider or within the authorizing provider's specialty.  See \"Patient Info\" tab in inbasket, or \"Choose Columns\" in Meds & Orders section of the refill encounter.               Last office visit: 5/2/2018 with prescribing provider:     Future Office Visit:      "

## 2018-07-31 NOTE — TELEPHONE ENCOUNTER
"Requested Prescriptions   Pending Prescriptions Disp Refills     simvastatin (ZOCOR) 20 MG tablet [Pharmacy Med Name: SIMVASTATIN 20 MG TABLET] 30 tablet 4      Last Written Prescription Date:  01/16/18  Last Fill Quantity: 30,  # refills: 4   Last office visit: 5/2/2018 with prescribing provider:  05/02/18   Future Office Visit:  0 Sig: TAKE 1 TABLET (20 MG) BY MOUTH AT BEDTIME    Statins Protocol Passed    7/30/2018 10:03 AM       Passed - LDL on file in past 12 months    Recent Labs   Lab Test  01/02/18   1128   LDL  73            Passed - No abnormal creatine kinase in past 12 months    No lab results found.            Passed - Recent (12 mo) or future (30 days) visit within the authorizing provider's specialty    Patient had office visit in the last 12 months or has a visit in the next 30 days with authorizing provider or within the authorizing provider's specialty.  See \"Patient Info\" tab in inbasket, or \"Choose Columns\" in Meds & Orders section of the refill encounter.           Passed - Patient is age 18 or older       Passed - No active pregnancy on record       Passed - No positive pregnancy test in past 12 months        "

## 2018-10-02 NOTE — TELEPHONE ENCOUNTER
Is there any way to tell if any of the other novel anticoagulants (Xarelto) would be better-covered by insurance?

## 2018-10-05 PROBLEM — E66.01 MORBID OBESITY (H): Status: ACTIVE | Noted: 2018-01-01

## 2018-10-05 NOTE — MR AVS SNAPSHOT
After Visit Summary   10/5/2018    Cydney Bucio    MRN: [id number]           Patient Information     Date Of Birth          1940        Visit Information        Provider Department      10/5/2018 2:30 PM Jeffery Zamora MD [facility]        Today's Diagnoses     History of CVA (cerebrovascular accident)    -  1    Paroxysmal atrial fibrillation (H)        Morbid obesity (H)        S/P craniotomy          Care Instructions    - Please schedule an appointment with SEGUN Fontaine (Dr. Mcclendon's new Physician Assistant) at [facility], within the next few weeks.    - [phone]    - Continue the Eliquis             Follow-ups after your visit        Who to contact     If you have questions or need follow up information about today's clinic visit or your schedule please contact [facility] directly at [phone].  Normal or non-critical lab and imaging results will be communicated to you by MyChart, letter or phone within 4 business days after the clinic has received the results. If you do not hear from us within 7 days, please contact the clinic through [employer] or phone. If you have a critical or abnormal lab result, we will notify you by phone as soon as possible.  Submit refill requests through [employer] or call your pharmacy and they will forward the refill request to us. Please allow 3 business days for your refill to be completed.          Additional Information About Your Visit        MyChart Information     [employer] gives you secure access to your electronic health record. If you see a primary care provider, you can also send messages to your care team and make appointments. If you have questions, please call your primary care clinic.  If you do not have a primary care provider, please call [phone] and they will assist you.        Care EveryWhere ID     This is your Care EveryWhere ID. This could be used by other  organizations to access your [place] medical records  [id number]        Your Vitals Were     Pulse Temperature Respirations Pulse Oximetry BMI (Body Mass Index)       81 97.7  F (36.5  C) (Oral) 16 93% 37.11 kg/m2        Blood Pressure from Last 3 Encounters:   10/05/18 120/70   05/02/18 130/80   04/06/18 93/64    Weight from Last 3 Encounters:   10/05/18 190 lb (86.2 kg)   04/06/18 202 lb (91.6 kg)   06/29/17 180 lb (81.6 kg)              Today, you had the following     No orders found for display         Today's Medication Changes          These changes are accurate as of 10/5/18  2:53 PM.  If you have any questions, ask your nurse or doctor.               These medicines have changed or have updated prescriptions.        Dose/Directions    apixaban ANTICOAGULANT 5 MG tablet   Commonly known as:  ELIQUIS   This may have changed:  medication strength   Used for:  History of CVA (cerebrovascular accident), Paroxysmal atrial fibrillation (H)        Dose:  5 mg   Take 1 tablet (5 mg) by mouth 2 times daily   Quantity:  28 tablet   Refills:  0            Where to get your medicines      These medications were sent to [facility]/pharmacy #[id number] - [facility] [address] [address] [facility]  [address] [address] [facility] [address]     Phone:  [phone]     apixaban ANTICOAGULANT 5 MG tablet                Primary Care Provider Office Phone # Fax #    Jeffery Zamora [phone] [address] [address]        Equal Access to Services     LUDY MCCLURE : Hadii phuc ku hadasho Soomaali, waaxda luqadaha, qaybta kaalmada adeegyada, becki bernard. So [facility] [place] [phone].    ATENCIÓN: Si habla español, tiene a hassan disposición servicios gratuitos de asistencia lingüística. Llame al [phone].    We comply with applicable federal civil rights laws and [place] laws. We do not discriminate on the basis of race, color, national origin, age, disability, sex, sexual  orientation, or gender identity.            Thank you!     Thank you for choosing [facility]  for your care. Our goal is always to provide you with excellent care. Hearing back from our patients is one way we can continue to improve our services. Please take a few minutes to complete the written survey that you may receive in the mail after your visit with us. Thank you!             Your Updated Medication List - Protect others around you: Learn how to safely use, store and throw away your medicines at www.disposemymeds.org.          This list is accurate as of 10/5/18  2:53 PM.  Always use your most recent med list.                   Brand Name Dispense Instructions for use Diagnosis    acetaminophen 325 MG tablet    TYLENOL     Take 650 mg by mouth 3 times daily as needed for mild pain        alendronate 70 MG tablet    FOSAMAX          apixaban ANTICOAGULANT 5 MG tablet    ELIQUIS    28 tablet    Take 1 tablet (5 mg) by mouth 2 times daily    History of CVA (cerebrovascular accident), Paroxysmal atrial fibrillation (H)       calcium carbonate-vitamin D 600-200 MG-UNIT Tabs      Take 1 tablet by mouth 2 times daily        cetirizine 10 MG tablet    zyrTEC     Take 10 mg by mouth daily        KEPPRA PO      Take 1,000 mg by mouth 2 times daily        metoprolol succinate 50 MG 24 hr tablet    TOPROL-XL    90 tablet    Take 1 tablet (50 mg) by mouth daily    Essential hypertension       potassium chloride SA 20 MEQ CR tablet    KLOR-CON    180 tablet    Take 1 tablet (20 mEq) by mouth 2 times daily    Edema       senna-docusate 8.6-50 MG per tablet    SENOKOT-S;PERICOLACE     Take 2 tablets by mouth daily as needed for constipation        simvastatin 20 MG tablet    ZOCOR    30 tablet    TAKE 1 TABLET (20 MG) BY MOUTH AT BEDTIME    Hyperlipidemia LDL goal <130       venlafaxine 150 MG 24 hr capsule    EFFEXOR-XR    90 capsule    TAKE 1 CAPSULE (150 MG) BY MOUTH DAILY    Mild major depression  (H)       vitamin E 400 units Tabs     MONTH    qd    Unspecified essential hypertension

## 2018-10-05 NOTE — PROGRESS NOTES
SUBJECTIVE:   Cydney Bucio is a 77 year old female who presents to clinic today for the following health issues:      Discussion about blood thinners.    Amount of exercise or physical activity: None    Problems taking medications regularly: No    Medication side effects: none    Diet: low fat/cholesterol            Problem list and histories reviewed & adjusted, as indicated.  Additional history: as documented    Patient has complicated urologic history.  She has previous history of debilitating CVA, due to cardio embolism and atrial fibrillation.  She had been on full anticoagulation with warfarin thereafter.  Then, in April 2016 she suffered a subdural hematoma necessitating surgical craniotomy, while she was on warfarin.  After recovery, she was started on Eliquis for anticoagulation in November 2016, and had documented continued regression of her subdural hematoma thereafter.  She has continued on Eliquis compliantly since then.    Unfortunately, this medication has become cost prohibitive.  Our anticoagulation clinic has checked with her insurance, and other novel anticoagulants would be similarly expensive.  She has asked if she can or should go back on warfarin for anticoagulation.    Reviewed and updated as needed this visit by clinical staff  Allergies  Meds       Reviewed and updated as needed this visit by Provider         ROS:  Constitutional, HEENT, cardiovascular, pulmonary, GI, , musculoskeletal, neuro, skin, endocrine and psych systems are negative, except as otherwise noted.    OBJECTIVE:     /70  Pulse 81  Temp 97.7  F (36.5  C) (Oral)  Resp 16  Wt 190 lb (86.2 kg)  SpO2 93%  BMI 37.11 kg/m2  Body mass index is 37.11 kg/(m^2).  NECK: no adenopathy, no asymmetry, masses, or scars and thyroid normal to palpation  RESP: lungs clear to auscultation - no rales, rhonchi or wheezes  CV: Irregular, no audible murmur  ABDOMEN: soft, nontender, no hepatosplenomegaly, no masses and bowel  sounds normal  MS: no gross musculoskeletal defects noted, no edema        ASSESSMENT/PLAN:     1. History of CVA (cerebrovascular accident)  I have discussed this case with patient's primary neurologist (Dr. Mcclendon), through his PA.  It is their preference that the patient remain on Eliquis, and that she not resume warfarin.  They feel that the risk of recurrent bleeding is too great with warfarin as opposed to Eliquis.  Given that this medication is cost prohibitive, they are willing to see her in clinic and provide samples.  Relayed this to the patient today, and instructed them to make an appointment with Dr. Mcclendon's PA (Ariana Saenz) within the next 1-2 weeks.  I wrote prescription for 2-weeks of Eliquis, which will hopefully last them until she can be seen by Neurology.  - apixaban ANTICOAGULANT (ELIQUIS) 5 MG tablet; Take 1 tablet (5 mg) by mouth 2 times daily  Dispense: 28 tablet; Refill: 0    2. Paroxysmal atrial fibrillation (H)  As above  - apixaban ANTICOAGULANT (ELIQUIS) 5 MG tablet; Take 1 tablet (5 mg) by mouth 2 times daily  Dispense: 28 tablet; Refill: 0    3. Morbid obesity (H)  Reiterated the importance of health diet    4. S/P craniotomy            Jeffery Zamora MD  [facility]

## 2018-10-05 NOTE — TELEPHONE ENCOUNTER
I have spoken with her Neurology Clinic (SEGUN Fontaine, who discussed case with Dr. Mcclendon).  It is their preference that she NOT restart warfarin, and that she remain on Eliquis.  They can see patient in clinic and provide samples of Eliquis if too cost prohibitive.    I am seeing this patient in clinic today, and will relay this to her.

## 2018-10-05 NOTE — PATIENT INSTRUCTIONS
- Please schedule an appointment with SEGUN Fontaine (Dr. Mcclendon's new Physician Assistant) at [facility], within the next few weeks.    - [phone]    - Continue the Eliquis

## 2018-10-15 NOTE — TELEPHONE ENCOUNTER
"Requested Prescriptions   Pending Prescriptions Disp Refills     metoprolol succinate (TOPROL-XL) 50 MG 24 hr tablet [Pharmacy Med Name: METOPROLOL SUCC ER 50 MG TAB]  Last Written Prescription Date:  06/25/2018  Last Fill Quantity: 90,  # refills: 0   Last office visit: 10/5/2018 with prescribing provider:   LENNIE   Future Office Visit:     90 tablet 0     Sig: TAKE 1 TABLET BY MOUTH EVERY DAY    Beta-Blockers Protocol Passed    10/14/2018 10:45 PM       Passed - Blood pressure under 140/90 in past 12 months    BP Readings from Last 3 Encounters:   10/05/18 120/70   05/02/18 130/80   04/06/18 93/64                Passed - Patient is age 6 or older       Passed - Recent (12 mo) or future (30 days) visit within the authorizing provider's specialty    Patient had office visit in the last 12 months or has a visit in the next 30 days with authorizing provider or within the authorizing provider's specialty.  See \"Patient Info\" tab in inbasket, or \"Choose Columns\" in Meds & Orders section of the refill encounter.              "

## 2018-12-11 NOTE — PATIENT INSTRUCTIONS
Off load any pressure as much as possible.  Donut cushion.  Extra pillow.    Time off the bottom during the day too.     If more redness, swelling or purulent drainage then recheck in clinic

## 2018-12-11 NOTE — PROGRESS NOTES
"  SUBJECTIVE:   Cydney Bucio is a 78 year old female who presents to clinic today for the following health issues:      Concern - Sore by rectum   Onset: x2 wks     Description:   Pt states she has had a sore by her rectum x2 wks, which now it is painful and bleeding     Intensity: moderate, severe    Progression of Symptoms:  worsening    Accompanying Signs & Symptoms:  Na     Previous history of similar problem:   No     Precipitating factors:   Worsened by: sitting/laying down     Alleviating factors:  Improved by: nothing     Therapies Tried and outcome: bacitracin- no relief     -------------------------------------    Problem list and histories reviewed & adjusted, as indicated.  Additional history: as documented    Labs reviewed in EPIC    Reviewed and updated as needed this visit by clinical staff  Tobacco  Allergies  Meds       Reviewed and updated as needed this visit by Provider  Allergies  Meds         ROS:  Constitutional, HEENT, cardiovascular, pulmonary, gi and gu systems are negative, except as otherwise noted.    OBJECTIVE:     /70 (BP Location: Left arm, Patient Position: Chair, Cuff Size: Adult Large)   Pulse 72   Temp 98  F (36.7  C) (Oral)   Resp 16   Ht 1.549 m (5' 1\")   Wt 86.2 kg (190 lb)   BMI 35.90 kg/m    Body mass index is 35.9 kg/m .  GENERAL: healthy, alert and no distress  RESP: lungs clear to auscultation - no rales, rhonchi or wheezes  CV: regular rates and rhythm  MS: no gross musculoskeletal defects noted, no edema  SKIN: stage 1 pressure sore noted left buttock about 3 cm in diameter  Stage 2 pressure sore at the center of this about 1-2 cm in diameter.      Diagnostic Test Results:  none     ASSESSMENT/PLAN:             1. Pressure ulcer of coccygeal region, stage 2    - order for DME; Equipment being ordered: Dressing  Wound #1 buttocks left , L 1-2  cm x, W 1-2 cm x, D   cm, Drainage scant, Thickness Partial   Stage 2 pressure ulcer    Stage 1 pressure around " this open area- about 2 cm    Type: Duoderm, Brand: , Size:  4x4  Change: as needed if dirty  Or every 3 to 7 days    Tape/Wrap:  Dispense: 1 Box; Refill: 3    2. Pressure ulcer of coccygeal region, stage 1    - order for DME; Equipment being ordered: Dressing  Wound #1 buttocks left , L 1-2  cm x, W 1-2 cm x, D   cm, Drainage scant, Thickness Partial   Stage 2 pressure ulcer    Stage 1 pressure around this open area- about 2 cm    Type: Duoderm, Brand: , Size:  4x4  Change: as needed if dirty  Or every 3 to 7 days    Tape/Wrap:  Dispense: 1 Box; Refill: 3    Patient Instructions   Off load any pressure as much as possible.  Donut cushion.  Extra pillow.    Time off the bottom during the day too.     If more redness, swelling or purulent drainage then recheck in clinic     I spent 25 minutes with patient > 50% of time on counseling and plan of care    Stacey Guevara PA-C  [facility]

## 2018-12-26 NOTE — LETTER
[facility]  [address] [address]  [phone]            Cydney Bucio  [address] [place] [address]        December 31, 2018    Dear Cydney,    While refilling your prescription today, we noticed that you are due to have labs drawn.  We will refill your prescription for 30 days, but a follow-up appointment must be made before any additional refills can be approved.     Taking care of your health is important to us and we look forward to seeing you in the near future.  Please call us at [phone] or [phone] (or use [employer]) to schedule an appointment.     Please disregard this notice if you have already made an appointment.    Sincerely,        [facility]

## 2018-12-26 NOTE — TELEPHONE ENCOUNTER
Requested Prescriptions   Pending Prescriptions Disp Refills     simvastatin (ZOCOR) 20 MG tablet 30 tablet 5    There is no refill protocol information for this order        Last Written Prescription Date:  7/31/2018  Last Fill Quantity: 30,  # refills: 5   Last Office Visit: 12/11/2018   Future Office Visit:

## 2018-12-31 NOTE — TELEPHONE ENCOUNTER
Ok for lab only? LDL due 1/2019 order Td'up please sign if appropriate.    Keira FELIX RN, BSN, PHN

## 2018-12-31 NOTE — TELEPHONE ENCOUNTER
"Requested Prescriptions   Pending Prescriptions Disp Refills     simvastatin (ZOCOR) 20 MG tablet 30 tablet 5    Statins Protocol Passed - 12/26/2018  1:48 PM       Passed - LDL on file in past 12 months    Recent Labs   Lab Test 01/02/18  1128   LDL 73            Passed - No abnormal creatine kinase in past 12 months    No lab results found.            Passed - Recent (12 mo) or future (30 days) visit within the authorizing provider's specialty    Patient had office visit in the last 12 months or has a visit in the next 30 days with authorizing provider or within the authorizing provider's specialty.  See \"Patient Info\" tab in inbasket, or \"Choose Columns\" in Meds & Orders section of the refill encounter.             Passed - Patient is age 18 or older       Passed - No active pregnancy on record       Passed - No positive pregnancy test in past 12 months          Letter sent.     Medication is being filled for 1 time refill only due to:  Patient needs to be seen because due for fasting labs.     Prescription approved per [facility] [place].    Keira FELIX RN, BSN, PHN      "

## 2019-01-01 ENCOUNTER — HOSPITAL ENCOUNTER (OUTPATIENT)
Dept: GENERAL RADIOLOGY | Facility: CLINIC | Age: 79
Discharge: HOME | End: 2019-03-01
Attending: PHYSICIAN ASSISTANT | Admitting: PHYSICIAN ASSISTANT
Payer: COMMERCIAL

## 2019-01-01 ENCOUNTER — TRANSFERRED RECORDS (OUTPATIENT)
Dept: HEALTH INFORMATION MANAGEMENT | Facility: CLINIC | Age: 79
End: 2019-01-01

## 2019-01-01 ENCOUNTER — HOSPITAL ENCOUNTER (INPATIENT)
Facility: CLINIC | Age: 79
LOS: 1 days | DRG: 871 | End: 2019-03-14
Attending: EMERGENCY MEDICINE | Admitting: INTERNAL MEDICINE
Payer: COMMERCIAL

## 2019-01-01 ENCOUNTER — HOSPITAL ENCOUNTER (OUTPATIENT)
Dept: CT IMAGING | Facility: CLINIC | Age: 79
Discharge: HOME | End: 2019-02-25
Attending: INTERNAL MEDICINE | Admitting: INTERNAL MEDICINE
Payer: COMMERCIAL

## 2019-01-01 ENCOUNTER — HOSPITAL ENCOUNTER (OUTPATIENT)
Dept: SPEECH THERAPY | Facility: CLINIC | Age: 79
Discharge: HOME | End: 2019-03-01
Attending: PHYSICIAN ASSISTANT | Admitting: PHYSICIAN ASSISTANT
Payer: COMMERCIAL

## 2019-01-01 ENCOUNTER — MYC REFILL (OUTPATIENT)
Dept: INTERNAL MEDICINE | Facility: CLINIC | Age: 79
End: 2019-01-01

## 2019-01-01 ENCOUNTER — APPOINTMENT (OUTPATIENT)
Dept: GENERAL RADIOLOGY | Facility: CLINIC | Age: 79
DRG: 871 | End: 2019-01-01
Attending: EMERGENCY MEDICINE
Payer: COMMERCIAL

## 2019-01-01 ENCOUNTER — APPOINTMENT (OUTPATIENT)
Dept: GENERAL RADIOLOGY | Facility: CLINIC | Age: 79
DRG: 871 | End: 2019-01-01
Attending: INTERNAL MEDICINE
Payer: COMMERCIAL

## 2019-01-01 ENCOUNTER — OFFICE VISIT (OUTPATIENT)
Dept: INTERNAL MEDICINE | Facility: CLINIC | Age: 79
End: 2019-01-01
Payer: COMMERCIAL

## 2019-01-01 VITALS
SYSTOLIC BLOOD PRESSURE: 82 MMHG | TEMPERATURE: 90.9 F | DIASTOLIC BLOOD PRESSURE: 46 MMHG | OXYGEN SATURATION: 76 % | WEIGHT: 165.57 LBS | BODY MASS INDEX: 30.47 KG/M2 | HEART RATE: 100 BPM | HEIGHT: 62 IN

## 2019-01-01 VITALS
BODY MASS INDEX: 30.04 KG/M2 | WEIGHT: 159 LBS | DIASTOLIC BLOOD PRESSURE: 60 MMHG | TEMPERATURE: 97.7 F | RESPIRATION RATE: 16 BRPM | SYSTOLIC BLOOD PRESSURE: 100 MMHG | HEART RATE: 90 BPM | OXYGEN SATURATION: 93 %

## 2019-01-01 DIAGNOSIS — A41.9 SEPTIC SHOCK (H): ICD-10-CM

## 2019-01-01 DIAGNOSIS — Z87.898 HISTORY OF SEIZURE: ICD-10-CM

## 2019-01-01 DIAGNOSIS — I69.320 APHASIA, POST-STROKE: ICD-10-CM

## 2019-01-01 DIAGNOSIS — E78.5 HYPERLIPIDEMIA LDL GOAL <130: ICD-10-CM

## 2019-01-01 DIAGNOSIS — R63.4 WEIGHT LOSS: Primary | ICD-10-CM

## 2019-01-01 DIAGNOSIS — S06.5XAA SUBDURAL HEMATOMA (H): ICD-10-CM

## 2019-01-01 DIAGNOSIS — G81.90 HEMIPLEGIA AFFECTING DOMINANT SIDE (H): ICD-10-CM

## 2019-01-01 DIAGNOSIS — R63.4 WEIGHT LOSS: ICD-10-CM

## 2019-01-01 DIAGNOSIS — I69.90 LATE EFFECTS OF CEREBROVASCULAR DISEASE: ICD-10-CM

## 2019-01-01 DIAGNOSIS — R53.81 PHYSICAL DECONDITIONING: ICD-10-CM

## 2019-01-01 DIAGNOSIS — F32.0 MILD MAJOR DEPRESSION (H): ICD-10-CM

## 2019-01-01 DIAGNOSIS — S06.5XAA SDH (SUBDURAL HEMATOMA) (H): ICD-10-CM

## 2019-01-01 DIAGNOSIS — Z86.73 HISTORY OF CVA (CEREBROVASCULAR ACCIDENT): ICD-10-CM

## 2019-01-01 DIAGNOSIS — R65.21 SEPTIC SHOCK (H): ICD-10-CM

## 2019-01-01 LAB
ALBUMIN SERPL-MCNC: 2.1 G/DL (ref 3.4–5)
ALBUMIN SERPL-MCNC: 3 G/DL (ref 3.4–5)
ALP SERPL-CCNC: 59 U/L (ref 40–150)
ALP SERPL-CCNC: 67 U/L (ref 40–150)
ALT SERPL W P-5'-P-CCNC: 15 U/L (ref 0–50)
ALT SERPL W P-5'-P-CCNC: 2902 U/L (ref 0–50)
ANION GAP SERPL CALCULATED.3IONS-SCNC: 1 MMOL/L (ref 3–14)
ANION GAP SERPL CALCULATED.3IONS-SCNC: 18 MMOL/L (ref 3–14)
ANION GAP SERPL CALCULATED.3IONS-SCNC: 19 MMOL/L (ref 3–14)
APTT PPP: 39 SEC (ref 22–37)
AST SERPL W P-5'-P-CCNC: 29 U/L (ref 0–45)
AST SERPL W P-5'-P-CCNC: 6150 U/L (ref 0–45)
BASE DEFICIT BLDA-SCNC: 22 MMOL/L
BASE DEFICIT BLDV-SCNC: 20.7 MMOL/L
BASOPHILS # BLD AUTO: 0 10E9/L (ref 0–0.2)
BASOPHILS NFR BLD AUTO: 0.1 %
BILIRUB SERPL-MCNC: 0.7 MG/DL (ref 0.2–1.3)
BILIRUB SERPL-MCNC: 2.2 MG/DL (ref 0.2–1.3)
BUN SERPL-MCNC: 25 MG/DL (ref 7–30)
BUN SERPL-MCNC: 34 MG/DL (ref 7–30)
BUN SERPL-MCNC: 42 MG/DL (ref 7–30)
CALCIUM SERPL-MCNC: 6.7 MG/DL (ref 8.5–10.1)
CALCIUM SERPL-MCNC: 9 MG/DL (ref 8.5–10.1)
CALCIUM SERPL-MCNC: 9.7 MG/DL (ref 8.5–10.1)
CHLORIDE SERPL-SCNC: 107 MMOL/L (ref 94–109)
CHLORIDE SERPL-SCNC: 107 MMOL/L (ref 94–109)
CHLORIDE SERPL-SCNC: 118 MMOL/L (ref 94–109)
CHOLEST SERPL-MCNC: 167 MG/DL
CO2 BLDCOV-SCNC: 14 MMOL/L (ref 21–28)
CO2 SERPL-SCNC: 20 MMOL/L (ref 20–32)
CO2 SERPL-SCNC: 33 MMOL/L (ref 20–32)
CO2 SERPL-SCNC: 9 MMOL/L (ref 20–32)
CREAT SERPL-MCNC: 0.8 MG/DL (ref 0.52–1.04)
CREAT SERPL-MCNC: 1.26 MG/DL (ref 0.52–1.04)
CREAT SERPL-MCNC: 1.56 MG/DL (ref 0.52–1.04)
DIFFERENTIAL METHOD BLD: ABNORMAL
EOSINOPHIL # BLD AUTO: 0 10E9/L (ref 0–0.7)
EOSINOPHIL NFR BLD AUTO: 0 %
ERYTHROCYTE [DISTWIDTH] IN BLOOD BY AUTOMATED COUNT: 15.8 % (ref 10–15)
ERYTHROCYTE [DISTWIDTH] IN BLOOD BY AUTOMATED COUNT: 15.9 % (ref 10–15)
GFR SERPL CREATININE-BSD FRML MDRD: 31 ML/MIN/{1.73_M2}
GFR SERPL CREATININE-BSD FRML MDRD: 41 ML/MIN/{1.73_M2}
GFR SERPL CREATININE-BSD FRML MDRD: 71 ML/MIN/{1.73_M2}
GLUCOSE BLDC GLUCOMTR-MCNC: 118 MG/DL (ref 70–99)
GLUCOSE SERPL-MCNC: 117 MG/DL (ref 70–99)
GLUCOSE SERPL-MCNC: 120 MG/DL (ref 70–99)
GLUCOSE SERPL-MCNC: 97 MG/DL (ref 70–99)
HCO3 BLD-SCNC: 8 MMOL/L (ref 21–28)
HCO3 BLDV-SCNC: 11 MMOL/L (ref 21–28)
HCT VFR BLD AUTO: 42.8 % (ref 35–47)
HCT VFR BLD AUTO: 43.7 % (ref 35–47)
HDLC SERPL-MCNC: 53 MG/DL
HGB BLD-MCNC: 13.8 G/DL (ref 11.7–15.7)
HGB BLD-MCNC: 13.9 G/DL (ref 11.7–15.7)
IMM GRANULOCYTES # BLD: 0.2 10E9/L (ref 0–0.4)
IMM GRANULOCYTES NFR BLD: 1 %
INR PPP: 4.94 (ref 0.86–1.14)
LACTATE BLD-SCNC: 10.7 MMOL/L (ref 0.7–2)
LACTATE BLD-SCNC: 8.4 MMOL/L (ref 0.7–2)
LACTATE BLD-SCNC: 9.4 MMOL/L (ref 0.7–2.1)
LDLC SERPL CALC-MCNC: 97 MG/DL
LIPASE SERPL-CCNC: 34 U/L (ref 73–393)
LYMPHOCYTES # BLD AUTO: 0.4 10E9/L (ref 0.8–5.3)
LYMPHOCYTES NFR BLD AUTO: 2.2 %
MAGNESIUM SERPL-MCNC: 1.8 MG/DL (ref 1.6–2.3)
MCH RBC QN AUTO: 31.3 PG (ref 26.5–33)
MCH RBC QN AUTO: 32.1 PG (ref 26.5–33)
MCHC RBC AUTO-ENTMCNC: 31.6 G/DL (ref 31.5–36.5)
MCHC RBC AUTO-ENTMCNC: 32.5 G/DL (ref 31.5–36.5)
MCV RBC AUTO: 99 FL (ref 78–100)
MCV RBC AUTO: 99 FL (ref 78–100)
MONOCYTES # BLD AUTO: 1.1 10E9/L (ref 0–1.3)
MONOCYTES NFR BLD AUTO: 5.3 %
NEUTROPHILS # BLD AUTO: 18 10E9/L (ref 1.6–8.3)
NEUTROPHILS NFR BLD AUTO: 91.4 %
NONHDLC SERPL-MCNC: 114 MG/DL
NRBC # BLD AUTO: 0.1 10*3/UL
NRBC BLD AUTO-RTO: 1 /100
NT-PROBNP SERPL-MCNC: ABNORMAL PG/ML (ref 0–1800)
OXYHGB MFR BLD: 98 % (ref 92–100)
OXYHGB MFR BLDV: 70 %
PCO2 BLD: 30 MM HG (ref 35–45)
PCO2 BLDV: 48 MM HG (ref 40–50)
PCO2 BLDV: 66 MM HG (ref 40–50)
PH BLD: 7.02 PH (ref 7.35–7.45)
PH BLDV: 6.94 PH (ref 7.32–7.43)
PH BLDV: 6.97 PH (ref 7.32–7.43)
PHOSPHATE SERPL-MCNC: 6 MG/DL (ref 2.5–4.5)
PLATELET # BLD AUTO: 133 10E9/L (ref 150–450)
PLATELET # BLD AUTO: 157 10E9/L (ref 150–450)
PO2 BLD: 265 MM HG (ref 80–105)
PO2 BLDV: 30 MM HG (ref 25–47)
PO2 BLDV: 63 MM HG (ref 25–47)
POTASSIUM SERPL-SCNC: 4.2 MMOL/L (ref 3.4–5.3)
POTASSIUM SERPL-SCNC: 4.8 MMOL/L (ref 3.4–5.3)
POTASSIUM SERPL-SCNC: 5.2 MMOL/L (ref 3.4–5.3)
PROT SERPL-MCNC: 5.8 G/DL (ref 6.8–8.8)
PROT SERPL-MCNC: 8.1 G/DL (ref 6.8–8.8)
RBC # BLD AUTO: 4.33 10E12/L (ref 3.8–5.2)
RBC # BLD AUTO: 4.41 10E12/L (ref 3.8–5.2)
SAO2 % BLDV FROM PO2: 28 %
SODIUM SERPL-SCNC: 141 MMOL/L (ref 133–144)
SODIUM SERPL-SCNC: 145 MMOL/L (ref 133–144)
SODIUM SERPL-SCNC: 146 MMOL/L (ref 133–144)
TRIGL SERPL-MCNC: 86 MG/DL
TROPONIN I SERPL-MCNC: 1.22 UG/L (ref 0–0.04)
TROPONIN I SERPL-MCNC: 1.87 UG/L (ref 0–0.04)
TSH SERPL DL<=0.005 MIU/L-ACNC: 1.64 MU/L (ref 0.4–4)
WBC # BLD AUTO: 12.5 10E9/L (ref 4–11)
WBC # BLD AUTO: 19.6 10E9/L (ref 4–11)

## 2019-01-01 PROCEDURE — 51702 INSERT TEMP BLADDER CATH: CPT

## 2019-01-01 PROCEDURE — 74177 CT ABD & PELVIS W/CONTRAST: CPT

## 2019-01-01 PROCEDURE — 82805 BLOOD GASES W/O2 SATURATION: CPT | Performed by: EMERGENCY MEDICINE

## 2019-01-01 PROCEDURE — 96365 THER/PROPH/DIAG IV INF INIT: CPT

## 2019-01-01 PROCEDURE — 25000128 H RX IP 250 OP 636: Performed by: EMERGENCY MEDICINE

## 2019-01-01 PROCEDURE — 85027 COMPLETE CBC AUTOMATED: CPT | Performed by: INTERNAL MEDICINE

## 2019-01-01 PROCEDURE — 93005 ELECTROCARDIOGRAM TRACING: CPT | Mod: 76

## 2019-01-01 PROCEDURE — 83880 ASSAY OF NATRIURETIC PEPTIDE: CPT | Performed by: EMERGENCY MEDICINE

## 2019-01-01 PROCEDURE — 99292 CRITICAL CARE ADDL 30 MIN: CPT

## 2019-01-01 PROCEDURE — 25000128 H RX IP 250 OP 636

## 2019-01-01 PROCEDURE — 96367 TX/PROPH/DG ADDL SEQ IV INF: CPT

## 2019-01-01 PROCEDURE — 25000125 ZZHC RX 250

## 2019-01-01 PROCEDURE — 85025 COMPLETE CBC W/AUTO DIFF WBC: CPT | Performed by: EMERGENCY MEDICINE

## 2019-01-01 PROCEDURE — 96368 THER/DIAG CONCURRENT INF: CPT

## 2019-01-01 PROCEDURE — 92526 ORAL FUNCTION THERAPY: CPT | Mod: GN

## 2019-01-01 PROCEDURE — 25800030 ZZH RX IP 258 OP 636: Performed by: EMERGENCY MEDICINE

## 2019-01-01 PROCEDURE — 82803 BLOOD GASES ANY COMBINATION: CPT

## 2019-01-01 PROCEDURE — 84100 ASSAY OF PHOSPHORUS: CPT | Performed by: INTERNAL MEDICINE

## 2019-01-01 PROCEDURE — 40000276 ZZH STATISTIC RCP TIME ED VENT EA 10 MIN

## 2019-01-01 PROCEDURE — 74230 X-RAY XM SWLNG FUNCJ C+: CPT

## 2019-01-01 PROCEDURE — 36620 INSERTION CATHETER ARTERY: CPT

## 2019-01-01 PROCEDURE — 36415 COLL VENOUS BLD VENIPUNCTURE: CPT

## 2019-01-01 PROCEDURE — 84484 ASSAY OF TROPONIN QUANT: CPT | Performed by: EMERGENCY MEDICINE

## 2019-01-01 PROCEDURE — 71260 CT THORAX DX C+: CPT

## 2019-01-01 PROCEDURE — 82805 BLOOD GASES W/O2 SATURATION: CPT | Performed by: INTERNAL MEDICINE

## 2019-01-01 PROCEDURE — 3E043XZ INTRODUCTION OF VASOPRESSOR INTO CENTRAL VEIN, PERCUTANEOUS APPROACH: ICD-10-PCS | Performed by: INTERNAL MEDICINE

## 2019-01-01 PROCEDURE — 85610 PROTHROMBIN TIME: CPT | Performed by: EMERGENCY MEDICINE

## 2019-01-01 PROCEDURE — 96375 TX/PRO/DX INJ NEW DRUG ADDON: CPT

## 2019-01-01 PROCEDURE — 84484 ASSAY OF TROPONIN QUANT: CPT | Performed by: INTERNAL MEDICINE

## 2019-01-01 PROCEDURE — 27210131 ZZH KIT ART LINE INSERTION

## 2019-01-01 PROCEDURE — 25800030 ZZH RX IP 258 OP 636: Performed by: INTERNAL MEDICINE

## 2019-01-01 PROCEDURE — 31500 INSERT EMERGENCY AIRWAY: CPT

## 2019-01-01 PROCEDURE — 99291 CRITICAL CARE FIRST HOUR: CPT | Mod: 25

## 2019-01-01 PROCEDURE — 5A1935Z RESPIRATORY VENTILATION, LESS THAN 24 CONSECUTIVE HOURS: ICD-10-PCS | Performed by: EMERGENCY MEDICINE

## 2019-01-01 PROCEDURE — 80053 COMPREHEN METABOLIC PANEL: CPT | Performed by: INTERNAL MEDICINE

## 2019-01-01 PROCEDURE — 40000275 ZZH STATISTIC RCP TIME EA 10 MIN

## 2019-01-01 PROCEDURE — 99291 CRITICAL CARE FIRST HOUR: CPT | Mod: 25 | Performed by: INTERNAL MEDICINE

## 2019-01-01 PROCEDURE — 36620 INSERTION CATHETER ARTERY: CPT | Performed by: INTERNAL MEDICINE

## 2019-01-01 PROCEDURE — 71045 X-RAY EXAM CHEST 1 VIEW: CPT

## 2019-01-01 PROCEDURE — 94002 VENT MGMT INPAT INIT DAY: CPT

## 2019-01-01 PROCEDURE — 83605 ASSAY OF LACTIC ACID: CPT | Performed by: EMERGENCY MEDICINE

## 2019-01-01 PROCEDURE — 25000125 ZZHC RX 250: Performed by: INTERNAL MEDICINE

## 2019-01-01 PROCEDURE — 36556 INSERT NON-TUNNEL CV CATH: CPT

## 2019-01-01 PROCEDURE — 40000986 XR CHEST PORT 1 VW

## 2019-01-01 PROCEDURE — 93010 ELECTROCARDIOGRAM REPORT: CPT | Performed by: INTERNAL MEDICINE

## 2019-01-01 PROCEDURE — 83605 ASSAY OF LACTIC ACID: CPT

## 2019-01-01 PROCEDURE — 00000146 ZZHCL STATISTIC GLUCOSE BY METER IP

## 2019-01-01 PROCEDURE — 25000125 ZZHC RX 250: Performed by: EMERGENCY MEDICINE

## 2019-01-01 PROCEDURE — 36415 COLL VENOUS BLD VENIPUNCTURE: CPT | Performed by: INTERNAL MEDICINE

## 2019-01-01 PROCEDURE — 83690 ASSAY OF LIPASE: CPT | Performed by: INTERNAL MEDICINE

## 2019-01-01 PROCEDURE — 93005 ELECTROCARDIOGRAM TRACING: CPT

## 2019-01-01 PROCEDURE — 20000003 ZZH R&B ICU

## 2019-01-01 PROCEDURE — 40000008 ZZH STATISTIC AIRWAY CARE

## 2019-01-01 PROCEDURE — 80061 LIPID PANEL: CPT | Performed by: INTERNAL MEDICINE

## 2019-01-01 PROCEDURE — 25800030 ZZH RX IP 258 OP 636

## 2019-01-01 PROCEDURE — 84443 ASSAY THYROID STIM HORMONE: CPT | Performed by: INTERNAL MEDICINE

## 2019-01-01 PROCEDURE — 27210179 ZZH KIT MONITOR CVP

## 2019-01-01 PROCEDURE — 83735 ASSAY OF MAGNESIUM: CPT | Performed by: INTERNAL MEDICINE

## 2019-01-01 PROCEDURE — 96366 THER/PROPH/DIAG IV INF ADDON: CPT

## 2019-01-01 PROCEDURE — 85730 THROMBOPLASTIN TIME PARTIAL: CPT | Performed by: EMERGENCY MEDICINE

## 2019-01-01 PROCEDURE — 92611 MOTION FLUOROSCOPY/SWALLOW: CPT | Mod: GN

## 2019-01-01 PROCEDURE — 87040 BLOOD CULTURE FOR BACTERIA: CPT | Performed by: EMERGENCY MEDICINE

## 2019-01-01 PROCEDURE — 25000128 H RX IP 250 OP 636: Performed by: INTERNAL MEDICINE

## 2019-01-01 PROCEDURE — 80048 BASIC METABOLIC PNL TOTAL CA: CPT | Performed by: EMERGENCY MEDICINE

## 2019-01-01 PROCEDURE — 99214 OFFICE O/P EST MOD 30 MIN: CPT | Performed by: INTERNAL MEDICINE

## 2019-01-01 RX ORDER — NOREPINEPHRINE BITARTRATE/D5W 16MG/250ML
0.03-0.4 PLASTIC BAG, INJECTION (ML) INTRAVENOUS CONTINUOUS
Status: DISCONTINUED | OUTPATIENT
Start: 2019-01-01 | End: 2019-01-01

## 2019-01-01 RX ORDER — IOPAMIDOL 755 MG/ML
80 INJECTION, SOLUTION INTRAVASCULAR ONCE
Status: COMPLETED | OUTPATIENT
Start: 2019-01-01 | End: 2019-01-01

## 2019-01-01 RX ORDER — ETOMIDATE 2 MG/ML
INJECTION INTRAVENOUS
Status: DISCONTINUED
Start: 2019-01-01 | End: 2019-01-01 | Stop reason: HOSPADM

## 2019-01-01 RX ORDER — NOREPINEPHRINE BITARTRATE/D5W 16MG/250ML
PLASTIC BAG, INJECTION (ML) INTRAVENOUS
Status: COMPLETED
Start: 2019-01-01 | End: 2019-01-01

## 2019-01-01 RX ORDER — SIMVASTATIN 20 MG
TABLET ORAL
Qty: 90 TABLET | Refills: 3 | OUTPATIENT
Start: 2019-01-01

## 2019-01-01 RX ORDER — NALOXONE HYDROCHLORIDE 0.4 MG/ML
.1-.4 INJECTION, SOLUTION INTRAMUSCULAR; INTRAVENOUS; SUBCUTANEOUS
Status: DISCONTINUED | OUTPATIENT
Start: 2019-01-01 | End: 2019-01-01 | Stop reason: HOSPADM

## 2019-01-01 RX ORDER — FENTANYL CITRATE 50 UG/ML
25 INJECTION, SOLUTION INTRAMUSCULAR; INTRAVENOUS ONCE
Status: COMPLETED | OUTPATIENT
Start: 2019-01-01 | End: 2019-01-01

## 2019-01-01 RX ORDER — BARIUM SULFATE 400 MG/ML
20 SUSPENSION ORAL ONCE
Status: COMPLETED | OUTPATIENT
Start: 2019-01-01 | End: 2019-01-01

## 2019-01-01 RX ORDER — PROPOFOL 10 MG/ML
INJECTION, EMULSION INTRAVENOUS
Status: DISCONTINUED
Start: 2019-01-01 | End: 2019-01-01 | Stop reason: WASHOUT

## 2019-01-01 RX ORDER — DOPAMINE HYDROCHLORIDE 160 MG/100ML
5-20 INJECTION, SOLUTION INTRAVENOUS CONTINUOUS
Status: DISCONTINUED | OUTPATIENT
Start: 2019-01-01 | End: 2019-01-01

## 2019-01-01 RX ORDER — FENTANYL CITRATE 50 UG/ML
INJECTION, SOLUTION INTRAMUSCULAR; INTRAVENOUS
Status: COMPLETED
Start: 2019-01-01 | End: 2019-01-01

## 2019-01-01 RX ORDER — FENTANYL CITRATE 50 UG/ML
50 INJECTION, SOLUTION INTRAMUSCULAR; INTRAVENOUS
Status: DISCONTINUED | OUTPATIENT
Start: 2019-01-01 | End: 2019-01-01 | Stop reason: HOSPADM

## 2019-01-01 RX ORDER — DOPAMINE HYDROCHLORIDE 160 MG/100ML
INJECTION, SOLUTION INTRAVENOUS
Status: COMPLETED
Start: 2019-01-01 | End: 2019-01-01

## 2019-01-01 RX ORDER — VANCOMYCIN HYDROCHLORIDE 1 G/200ML
1000 INJECTION, SOLUTION INTRAVENOUS ONCE
Status: COMPLETED | OUTPATIENT
Start: 2019-01-01 | End: 2019-01-01

## 2019-01-01 RX ORDER — SODIUM CHLORIDE 9 MG/ML
INJECTION, SOLUTION INTRAVENOUS CONTINUOUS
Status: DISCONTINUED | OUTPATIENT
Start: 2019-01-01 | End: 2019-01-01

## 2019-01-01 RX ORDER — SIMVASTATIN 20 MG
TABLET ORAL
Qty: 90 TABLET | Refills: 2 | Status: SHIPPED | OUTPATIENT
Start: 2019-01-01

## 2019-01-01 RX ORDER — SIMVASTATIN 20 MG
TABLET ORAL
Qty: 30 TABLET | Refills: 0 | OUTPATIENT
Start: 2019-01-01

## 2019-01-01 RX ORDER — CHLORHEXIDINE GLUCONATE ORAL RINSE 1.2 MG/ML
15 SOLUTION DENTAL EVERY 12 HOURS
Status: DISCONTINUED | OUTPATIENT
Start: 2019-01-01 | End: 2019-01-01 | Stop reason: HOSPADM

## 2019-01-01 RX ORDER — SODIUM CHLORIDE 9 MG/ML
INJECTION, SOLUTION INTRAVENOUS ONCE
Status: COMPLETED | OUTPATIENT
Start: 2019-01-01 | End: 2019-01-01

## 2019-01-01 RX ORDER — PIPERACILLIN SODIUM, TAZOBACTAM SODIUM 3; .375 G/15ML; G/15ML
3.38 INJECTION, POWDER, LYOPHILIZED, FOR SOLUTION INTRAVENOUS EVERY 6 HOURS
Status: DISCONTINUED | OUTPATIENT
Start: 2019-01-01 | End: 2019-01-01 | Stop reason: HOSPADM

## 2019-01-01 RX ORDER — PIPERACILLIN SODIUM, TAZOBACTAM SODIUM 3; .375 G/15ML; G/15ML
3.38 INJECTION, POWDER, LYOPHILIZED, FOR SOLUTION INTRAVENOUS ONCE
Status: COMPLETED | OUTPATIENT
Start: 2019-01-01 | End: 2019-01-01

## 2019-01-01 RX ORDER — NICOTINE POLACRILEX 4 MG
15-30 LOZENGE BUCCAL
Status: DISCONTINUED | OUTPATIENT
Start: 2019-01-01 | End: 2019-01-01 | Stop reason: HOSPADM

## 2019-01-01 RX ORDER — LEVETIRACETAM 10 MG/ML
1000 INJECTION INTRAVASCULAR EVERY 24 HOURS
Status: DISCONTINUED | OUTPATIENT
Start: 2019-01-01 | End: 2019-01-01 | Stop reason: HOSPADM

## 2019-01-01 RX ORDER — DEXTROSE MONOHYDRATE 25 G/50ML
25-50 INJECTION, SOLUTION INTRAVENOUS
Status: DISCONTINUED | OUTPATIENT
Start: 2019-01-01 | End: 2019-01-01 | Stop reason: HOSPADM

## 2019-01-01 RX ADMIN — BARIUM SULFATE 20 ML: 400 SUSPENSION ORAL at 11:44

## 2019-01-01 RX ADMIN — SODIUM BICARBONATE: 84 INJECTION, SOLUTION INTRAVENOUS at 04:14

## 2019-01-01 RX ADMIN — NOREPINEPHRINE BITARTRATE 0.04 MCG/KG/MIN: 1 INJECTION INTRAVENOUS at 01:02

## 2019-01-01 RX ADMIN — ASCORBIC ACID 1500 MG: 500 INJECTION, SOLUTION INTRAMUSCULAR; INTRAVENOUS; SUBCUTANEOUS at 04:24

## 2019-01-01 RX ADMIN — THIAMINE HYDROCHLORIDE 200 MG: 100 INJECTION, SOLUTION INTRAMUSCULAR; INTRAVENOUS at 04:31

## 2019-01-01 RX ADMIN — PIPERACILLIN SODIUM,TAZOBACTAM SODIUM 3.38 G: 3; .375 INJECTION, POWDER, FOR SOLUTION INTRAVENOUS at 23:40

## 2019-01-01 RX ADMIN — SODIUM CHLORIDE: 9 INJECTION, SOLUTION INTRAVENOUS at 01:49

## 2019-01-01 RX ADMIN — VANCOMYCIN HYDROCHLORIDE 1000 MG: 1 INJECTION, SOLUTION INTRAVENOUS at 02:08

## 2019-01-01 RX ADMIN — FENTANYL CITRATE 50 MCG: 50 INJECTION, SOLUTION INTRAMUSCULAR; INTRAVENOUS at 03:00

## 2019-01-01 RX ADMIN — EPINEPHRINE 0.05 MCG/KG/MIN: 1 INJECTION INTRAMUSCULAR; INTRAVENOUS; SUBCUTANEOUS at 04:20

## 2019-01-01 RX ADMIN — NOREPINEPHRINE BITARTRATE 0.4 MCG/KG/MIN: 1 INJECTION INTRAVENOUS at 01:12

## 2019-01-01 RX ADMIN — VANCOMYCIN HYDROCHLORIDE 750 MG: 1 INJECTION, POWDER, LYOPHILIZED, FOR SOLUTION INTRAVENOUS at 04:56

## 2019-01-01 RX ADMIN — SODIUM CHLORIDE 2000 ML: 9 INJECTION, SOLUTION INTRAVENOUS at 02:25

## 2019-01-01 RX ADMIN — SODIUM CHLORIDE 1000 ML: 9 INJECTION, SOLUTION INTRAVENOUS at 23:33

## 2019-01-01 RX ADMIN — SODIUM CHLORIDE 1000 ML: 9 INJECTION, SOLUTION INTRAVENOUS at 01:00

## 2019-01-01 RX ADMIN — FENTANYL CITRATE 25 MCG/HR: 50 INJECTION, SOLUTION INTRAMUSCULAR; INTRAVENOUS at 03:54

## 2019-01-01 RX ADMIN — MIDAZOLAM HYDROCHLORIDE 2 MG: 1 INJECTION, SOLUTION INTRAMUSCULAR; INTRAVENOUS at 03:03

## 2019-01-01 RX ADMIN — DOPAMINE HYDROCHLORIDE 5 MCG/KG/MIN: 160 INJECTION, SOLUTION INTRAVENOUS at 01:30

## 2019-01-01 RX ADMIN — IOPAMIDOL 80 ML: 755 INJECTION, SOLUTION INTRAVENOUS at 15:03

## 2019-01-01 RX ADMIN — HYDROCORTISONE SODIUM SUCCINATE 50 MG: 100 INJECTION, POWDER, FOR SOLUTION INTRAMUSCULAR; INTRAVENOUS at 04:14

## 2019-01-01 RX ADMIN — SODIUM CHLORIDE: 9 INJECTION, SOLUTION INTRAVENOUS at 04:51

## 2019-01-01 RX ADMIN — FENTANYL CITRATE 25 MCG: 50 INJECTION, SOLUTION INTRAMUSCULAR; INTRAVENOUS at 02:30

## 2019-01-01 RX ADMIN — LEVETIRACETAM 1000 MG: 10 INJECTION INTRAVENOUS at 04:23

## 2019-01-01 RX ADMIN — FENTANYL CITRATE 25 MCG: 50 INJECTION, SOLUTION INTRAMUSCULAR; INTRAVENOUS at 01:00

## 2019-01-01 RX ADMIN — FENTANYL CITRATE 25 MCG: 50 INJECTION, SOLUTION INTRAMUSCULAR; INTRAVENOUS at 02:07

## 2019-01-01 ASSESSMENT — MIFFLIN-ST. JEOR
SCORE: 1184.25
SCORE: 1159.01

## 2019-01-01 ASSESSMENT — ENCOUNTER SYMPTOMS
BLOOD IN STOOL: 0
ABDOMINAL PAIN: 0
WEAKNESS: 1
SHORTNESS OF BREATH: 0
NUMBNESS: 0
HEADACHES: 0
FEVER: 0
CHILLS: 0
VOMITING: 0

## 2019-01-01 ASSESSMENT — ACTIVITIES OF DAILY LIVING (ADL)
ADLS_ACUITY_SCORE: 23
ADLS_ACUITY_SCORE: 27

## 2019-01-09 NOTE — TELEPHONE ENCOUNTER
"Requested Prescriptions   Pending Prescriptions Disp Refills     simvastatin (ZOCOR) 20 MG tablet 30 tablet 0     Sig: TAKE 1 TABLET (20 MG) BY MOUTH AT BEDTIME    Statins Protocol Failed - 1/9/2019 11:12 AM       Failed - LDL on file in past 12 months    Recent Labs   Lab Test 01/02/18  1128   LDL 73            Passed - No abnormal creatine kinase in past 12 months    No lab results found.            Passed - Recent (12 mo) or future (30 days) visit within the authorizing provider's specialty    Patient had office visit in the last 12 months or has a visit in the next 30 days with authorizing provider or within the authorizing provider's specialty.  See \"Patient Info\" tab in inbasket, or \"Choose Columns\" in Meds & Orders section of the refill encounter.             Passed - Medication is active on med list       Passed - Patient is age 18 or older       Passed - No active pregnancy on record       Passed - No positive pregnancy test in past 12 months        "

## 2019-02-08 NOTE — TELEPHONE ENCOUNTER
"Requested Prescriptions   Pending Prescriptions Disp Refills     simvastatin (ZOCOR) 20 MG tablet 30 tablet 0    Last Written Prescription Date:  12/31/18  Last Fill Quantity: 30,  # refills: 0   Last office visit: 12/11/2018 with prescribing provider:  10/5/18   Future Office Visit:     Sig: TAKE 1 TABLET (20 MG) BY MOUTH AT BEDTIME    Statins Protocol Passed - 2/8/2019 10:36 AM       Passed - LDL on file in past 12 months    Recent Labs   Lab Test 01/18/19  1044   LDL 97            Passed - No abnormal creatine kinase in past 12 months    No lab results found.            Passed - Recent (12 mo) or future (30 days) visit within the authorizing provider's specialty    Patient had office visit in the last 12 months or has a visit in the next 30 days with authorizing provider or within the authorizing provider's specialty.  See \"Patient Info\" tab in inbasket, or \"Choose Columns\" in Meds & Orders section of the refill encounter.             Passed - Medication is active on med list       Passed - Patient is age 18 or older       Passed - No active pregnancy on record       Passed - No positive pregnancy test in past 12 months          "

## 2019-02-10 NOTE — TELEPHONE ENCOUNTER
"Requested Prescriptions   Pending Prescriptions Disp Refills     simvastatin (ZOCOR) 20 MG tablet [Pharmacy Med Name: SIMVASTATIN 20 MG TABLET] 30 tablet 0    Last Written Prescription Date:  12/31/2018  Last Fill Quantity: 30,  # refills: 0   Last office visit: 12/11/2018 with prescribing provider:  12/11/2018   Future Office Visit:     Sig: TAKE 1 TABLET (20 MG) BY MOUTH AT BEDTIME    Statins Protocol Passed - 2/10/2019 12:32 AM       Passed - LDL on file in past 12 months    Recent Labs   Lab Test 01/18/19  1044   LDL 97            Passed - No abnormal creatine kinase in past 12 months    No lab results found.            Passed - Recent (12 mo) or future (30 days) visit within the authorizing provider's specialty    Patient had office visit in the last 12 months or has a visit in the next 30 days with authorizing provider or within the authorizing provider's specialty.  See \"Patient Info\" tab in inbasket, or \"Choose Columns\" in Meds & Orders section of the refill encounter.             Passed - Medication is active on med list       Passed - Patient is age 18 or older       Passed - No active pregnancy on record       Passed - No positive pregnancy test in past 12 months          "

## 2019-02-11 NOTE — TELEPHONE ENCOUNTER
Prescription approved per [facility] Refill Protocol.    Keira FELIX RN, BSN, PHN

## 2019-02-18 NOTE — PROGRESS NOTES
SUBJECTIVE:   Cydney Bucio is a 78 year old female who presents to clinic today for the following health issues:      Pt is here with some weight loss concerns pt weighed around 190 back in December and October, and now weighs 159.     Pt and  have some other concerns about urine, loss of appetite , sleeping more, confusion, anger, memory problems, and spots on arms. (All started about a month ago).          Problem list and histories reviewed & adjusted, as indicated.  Additional history:     To review, patient has subdural hematoma and April 2016, after presenting with generalized seizure while on warfarin for atrial fibrillation due to history of CVA.  She underwent craniotomy and seemed to recover reasonably well.  She has since been restarted on Eliquis by neurology, has had documented regression of subdural hematoma after restarting.    Reports difficulty with swallowing and marginal p.o. intake in recent weeks.  Just saw a neurologist 4 days ago who ordered a myasthenia gravis panel, and a swallow study.    Patient is also been more cm, more angry.    Reviewed and updated as needed this visit by clinical staff  Tobacco  Allergies  Meds       Reviewed and updated as needed this visit by Provider         ROS:  Constitutional, HEENT, cardiovascular, pulmonary, GI, , musculoskeletal, neuro, skin, endocrine and psych systems are negative, except as otherwise noted.    OBJECTIVE:     /60 (BP Location: Left arm, Patient Position: Chair, Cuff Size: Adult Regular)   Pulse 90   Temp 97.7  F (36.5  C) (Oral)   Resp 16   Wt 72.1 kg (159 lb)   SpO2 93%   BMI 30.04 kg/m    Body mass index is 30.04 kg/m .  NECK: no adenopathy, no asymmetry, masses, or scars and thyroid normal to palpation  RESP: lungs clear to auscultation - no rales, rhonchi or wheezes  CV: regular rate and rhythm, normal S1 S2, no S3 or S4, no murmur, click or rub, no peripheral edema and peripheral pulses strong  ABDOMEN:  soft, nontender, no hepatosplenomegaly, no masses and bowel sounds normal  MS: no gross musculoskeletal defects noted, no edema        ASSESSMENT/PLAN:     1. Weight loss  Check labs as ordered, and CT of chest/abd/pelvis  - Comprehensive metabolic panel (BMP + Alb, Alk Phos, ALT, AST, Total. Bili, TP)  - CBC with platelets  - TSH with free T4 reflex  - CT Chest/Abdomen/Pelvis w Contrast; Future    2. History of seizure      3. History of CVA (cerebrovascular accident)      4. SDH (subdural hematoma) (H)      5. Physical deconditioning      6. Mild major depression (H)        See Patient Instructions    Jeffery Zamora MD  [facility]

## 2019-03-01 NOTE — PROGRESS NOTES
"Video Swallow Study:    03/01/19 1100   General Information   Type Of Visit Initial   Start Of Care Date 03/01/19   Referring Physician Ariana Saenz (TERENCE)   Orders Evaluate And Treat   Orders Comment clinical swallow evaluation, VFSS and treatment as indicated.    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 Pt's appetite is less and she is leaving food on her plate. Pt notices she is having more frequent issues with swallowing and also notices that her speech is worse, \"more pronounced aphasia.\" Neurologist thinking possible myasthenia gravis (ordered a MG panel) and recommended a video swallow study to further assess swallow function. She has an extensive neurological hx including a SDH on 4/12/16 presenting with seizures s/p craniotomy on 4/17/16 and hx of CVA in 2003 and 2014. She has been seen by SLP for both swallowing and speech/language. Latest clinical swallow eval was completed on 12/21/16 which revealed mild oropharyngeal dysphagia with recommendations of general solids/thin liquids with no straws. Most recent Speech/language intervention was completed between 1/4/17-1/31/17 where she demonstrated moderate to severe expressive and receptive aphasia and the SLP reported the pt \"has completed 7 treatment sessions and has demonstrated minimal progress in expressive and receptive language skills. Discharge is appropriate at this time.\" Pt and spouse report increased coughing with PO and also on her saliva randomly, occurring at least once a day but up to 2/3 times a day. No diet restrictions but spouse cuts food into small bites. They also report the myasthenia " gravis panel has not yet been completed. They live at home together,  assists patient.  reports she does eat quickly/takes large bites.    VFSS Eval: Radiology   Radiologist Dr. Salazar   Views Taken left lateral   Physical Location of Procedure [facility]   VFSS Eval: Thin Liquid Texture Trial   Mode of Presentation, Thin Liquid cup;straw;self-fed   Order of Presentation 1 cup, 2 cup, 3 straw, 9 cup   Preparatory Phase Poor bolus control   Oral Phase, Thin Liquid Premature pharyngeal entry   Pharyngeal Phase, Thin Liquid Delayed swallow reflex;Residue in valleculae;Residue in pyriform sinus  (pyriform sinus trigger)   Rosenbek's Penetration Aspiration Scale: Thin Liquid Trial Results 5 - contrast contacts vocal cords, visible residue remains (penetration)   Response to Aspiration (no cough response, unable to cough with cues)   Diagnostic Statement x1 instance of deep penetration to the cords only with straw drinking, flash penetration on 1/3 cup sips   VFSS Eval: Nectar Thick Liquid Texture Trial   Mode of Presentation, Nectar cup;self-fed   Order of Presentation 4, 10   Preparatory Phase Poor bolus control   Oral Phase, Nectar Premature pharyngeal entry   Pharyngeal Phase, Nectar Delayed swallow reflex;Residue in valleculae;Residue in pyriform sinus  (pyrifrom sinus trigger (but less spillage comared to thin))   Rosenbek's Penetration Aspiration Scale: Nectar-Thick Liquid Trial Results 1 - no aspiration, contrast does not enter airway   VFSS Eval: Puree Solid Texture Trial   Mode of Presentation, Puree spoon;self-fed   Order of Presentation 5, 6   Preparatory Phase Poor bolus control   Oral Phase, Puree Premature pharyngeal entry   Pharyngeal Phase, Puree Delayed swallow reflex;Residue in valleculae;Residue in pyriform sinus  (valleculae trigger (some just over tip of epiglottis))   Rosenbek's Penetration Aspiration Scale: Puree Food Trial Results 1 - no aspiration, contrast does not enter airway   VFSS  Eval: Solid Food Texture Trial   Mode of Presentation, Solid self-fed   Order of Presentation 7   Preparatory Phase (slow mastication)   Oral Phase, Solid Poor AP movement;Premature pharyngeal entry;Residue in oral cavity   Pharyngeal Phase, Solid Delayed swallow reflex;Residue in valleculae;Residue in pyriform sinus   Rosenbek's Penetration Aspiration Scale: Solid Food Trial Results 1 - no aspiration, contrast does not enter airway   Diagnostic Statement slow oral phase/stickiness made it hard to trigger swallow - would benefit from moister options   Esophageal Phase of Swallow   Patient reports or presents with symptoms of esophageal dysphagia No   Esophageal sweep performed during today s vidofluoroscopic exam  No   General Therapy Interventions   Planned Therapy Interventions Dysphagia Treatment   Swallow Eval: Clinical Impressions   Skilled Criteria for Therapy Intervention Skilled criteria met.  Treatment indicated.   Functional Assessment Scale (FAS) 5   Dysphagia Outcome Severity Scale (RANDY) Level 5 - RANDY   Treatment Diagnosis mild oropharyngeal dysphagia   Diet texture recommendations Regular diet;Thin liquids  (no straws, softer/moister as needed)   Recommended Feeding/Eating Techniques alternate between small bites and sips of food/liquid;maintain upright posture during/after eating for 30 mins;no straws;small sips/bites   Rehab Potential good, to achieve stated therapy goals   Therapy Frequency (x1 f/u session after eval, further tx pending neuro workup)   Predicted Duration of Therapy Intervention (days/wks) x1 follow up after evaluation   Anticipated Discharge Disposition home  (OP in future as needed)   Risks and Benefits of Treatment have been explained. Yes   Patient, family and/or staff in agreement with Plan of Care Yes   Clinical Impression Comments Video swallow study completed with thin liquids (images 1, 2, 3, 9), nectar thick liquids (images 4, 10), puree solids (images 5, 6), general  solids (image 7). Pt presents with mild oropharyngeal dysphagia. Oral phase notable for reduced oral control, mild-mod reduced tongue base retraction and premature spillage with all (increased premature spillage with thinner consistencies/liquids vs thicker consistencies/solids). Hyolaryngeal elevation/excursion judged mildly reduced, epiglottic inversion moderately reduced. Given premature spillage and reduced laryngeal closure, trace flash in out penetration noted with thin liquid via cup and min before/during anterior and posterior penetration to the cords with thin liquids via straw. Pt did not have a cough response to deep penetration. Min-mod pharyngeal residuals (base of tongue, valleculae, pyriform sinus) with all consistencies assessed. Pt unable to cough or complete double swallows despite cues, suspect some aspect of apraxia inhibiting same. Recommend: general solids (chose softer as needed, cut into small pieces, extra moisture such as sauces/gravies/condiments) with thin liquids (NO straws). Strategies: slow pace, single bites at a time with liquid wash every 1-3 bites, double swallows as able. If pt has increased swallowing difficulty in future or any respiratory infections/PNA she may need nectar thick liquids or repeat video swallow study.     Swallow tx completed after video swallow study with pt/spouse. Review of videos/anatomy/physiology and explanation of current swallow function provided. Educated on current deficits, diet recommendations, swallow strategies. Also educated on swallow tx that could be completed for oropharyngeal exercises, however will defer until myasthenia gravis panel comes back as exercises are contraindicated/could make swallowing worse - neurology please consult SLP services if MG panel NEGATIVE for MG and exercises can be initiated. Pt/spouse verbalize understanding of all, all questions answered.      Swallow Goal 1   Goal Identifier short term goal 1   Goal Description  Pt and spouse will understand results/recommendations of video swallow study.    Target Date 03/01/19   Date Met 03/01/19   Total Session Time   SLP Eval: VideoFluoroscopic Swallow function Minutes (29165) 45   Total Evaluation Time 45   Therapy Certification   Certification date from 03/01/19   Certification date to 06/01/19   Medical Diagnosis mild oropharyngeal dysphagia   Certification I certify the need for these services furnished under this plan of treatment and while under my care.  (Physician co-signature of this document indicates review and certification of the therapy plan).   SLP G-Codes   G-code Swallowing   Swallow Current Status () CI   Swallow Goal Status () CI   Swallow Discharge Status () CI

## 2019-03-14 PROBLEM — R65.21 SEPTIC SHOCK (H): Status: ACTIVE | Noted: 2019-01-01

## 2019-03-14 PROBLEM — A41.9 SEPTIC SHOCK (H): Status: ACTIVE | Noted: 2019-01-01

## 2019-03-14 NOTE — PHARMACY-VANCOMYCIN DOSING SERVICE
Pharmacy Vancomycin Initial Note  Date of Service 2019  Patient's  1940  78 year old, female    Indication: [facility] Pneumonia    Current estimated CrCl = Estimated Creatinine Clearance: 27.7 mL/min (A) (based on SCr of 1.56 mg/dL (H)).    Creatinine for last 3 days  3/13/2019: 10:36 PM Creatinine 1.56 mg/dL    Recent Vancomycin Level(s) for last 3 days  No results found for requested labs within last 72 hours.      Vancomycin IV Administrations (past 72 hours)                   vancomycin (VANCOCIN) 1000 mg in dextrose 5% 200 mL PREMIX (mg) 1,000 mg New Bag 19 0208                Nephrotoxins and other renal medications (From now, onward)    Start     Dose/Rate Route Frequency Ordered Stop    19 0600  piperacillin-tazobactam (ZOSYN) 3.375 g vial to attach to  mL bag      3.375 g  over 1 Hours Intravenous EVERY 6 HOURS 19 0331      19 0400  vancomycin place jang - receiving intermittent dosing      1 each Intravenous SEE ADMIN INSTRUCTIONS 19 0402      19 0400  vancomycin (VANCOCIN) 750 mg in sodium chloride 0.9 % 250 mL intermittent infusion      750 mg  over 90 Minutes Intravenous ONCE 19 0359      19 0049  norepinephrine (LEVOPHED) 16 mg in D5W 250 mL infusion      0.03-0.4 mcg/kg/min × 72.6 kg  2-27.2 mL/hr  Intravenous CONTINUOUS 19 0048            Contrast Orders - past 72 hours (72h ago, onward)    None                Plan:  1.  Start vancomycin  Intermittent dosing.   2.  Goal Trough Level: 15-20 mg/L   3.  Pharmacy will check trough levels as appropriate in 1-3 Days.    4. Serum creatinine levels will be ordered daily for the first week of therapy and at least twice weekly for subsequent weeks.    5. [place] method utilized to dose vancomycin therapy: Method 1    Saman Shah

## 2019-03-14 NOTE — PROCEDURES
"Procedure/Surgery Information   [facility]     Procedure Note  Date of Service (when I performed the procedure): 03/14/2019    Procedure: left femoral arterial line    I have reviewed the lab findings, diagnostic data, medications, and the plan for procedure. I have determined this patient to be an appropriate candidate for the planned procedure and have reassessed the patient IMMEDIATELY PRIOR to ocedure.  Prior to the start of the procedure and with procedural staff participation, I verbally confirmed the patient s identity using two indicators, relevant allergies, that the procedure was appropriate and matched the consent or emergent situation, and that the correct equipment/implants were available. Immediately prior to starting the procedure I conducted the Time Out with the procedural staff and re-confirmed the patient s name, procedure, and site/side. (The Joint Commission universal protocol was followed.)  Yes    Insert arterial line  Date/Time: 3/14/2019 4:44 AM  Performed by: Tawanda Kilgore MD  Authorized by: Tawanda Kilgore MD   Consent: The procedure was performed in an emergent situation. Verbal consent obtained.  Risks and benefits: risks, benefits and alternatives were discussed  Consent given by: guardian  Required items: required blood products, implants, devices, and special equipment available  Patient identity confirmed: verbally with patient  Time out: Immediately prior to procedure a \"time out\" was called to verify the correct patient, procedure, equipment, support staff and site/side marked as required.  Preparation: Patient was prepped and draped in the usual sterile fashion.  Indications: hemodynamic monitoring  Location: left femoral  Anesthesia: local infiltration    Anesthesia:  Local Anesthetic: lidocaine 1% without epinephrine  Anesthetic total: 5 mL    Sedation:  Patient sedated: yes  Sedatives: fentanyl    Seldinger technique: Seldinger " technique used  Number of attempts: 2  Post-procedure: line sutured and dressing applied  Post-procedure CMS: normal  Patient tolerance: Patient tolerated the procedure well with no immediate complications            Sedatives: Fentanyl    Patient tolerance: Patient tolerated the procedure well with no immediate complications.    Performed by: Tawanda Kilgore  Authorized by: Tawanda Kilgore

## 2019-03-14 NOTE — PROGRESS NOTES
MD DEATH PRONOUNCEMENT    Called to pronounce Cydney Bucio dead.    Physical Exam: Unresponsive to noxious stimuli, PUPILLS FIXED AND DILATED    Patient was pronounced dead at 0645AM, March 14, 2019.    Preliminary Cause of Death: bacterial pneumonia    Active Problems:    Septic shock (H)     Infectious disease present?: YES    Communicable disease present? (examples: HIV, chicken pox, TB, Ebola, CJD) :  NO    Multi-drug resistant organism present? (example: MRSA): NO    Body disposition: Autopsy was discussed with family member:  Spouse in person.  Permission for autopsy was declined.    Tawanda Kilgore MD  Pulmonary & Critical Care Medicine  Pgr: [phone]

## 2019-03-14 NOTE — SIGNIFICANT EVENT
left before nurse could touch base about  home and to give him patient belongings.  Barber was called and given number of ANS to call when he has  home info.  Also informed that her belongings (clothes only) will be sent down with body.  Lines pulled, eyes iced.  Security called for body .

## 2019-03-14 NOTE — PROGRESS NOTES
Critical Care Consultation      03/14/2019    Name: Cydney Bucio MRN#: [id number]   Age: 78 year old YOB: 1940     ICU day 0  Hospital day 0         Summary/Hospital Course:   This is a 78-year-old lady with a past mental history significant for subdural hematoma in 2016, stroke secondary to atrial fibrillation with residual hemiparesis and aphasia, current anticoagulation use for atrial fibrillation, memory impairment, gait instability needing assistance but living independently at home who is referred in for weakness and lethargy developing over the past 3-4 days.      Patient presented the emergency room with subacute weakness over the last couple days.  The patient has been undergoing swallow studies to evaluate for aspiration after her stroke and her prior subdural and recently been cleared to take oral foods.  There is no obvious history of aspiration but she is certainly at risk.  The patient complained of fatigue and low energy throughout the day and had a subtle fall that was witnessed by the .  She did not hit her head did not actually fall, he caught her, but she became weak and could not stand when trying to go to the bathroom.  The  then called 911 she presented to the emergency room.    There is no vomiting reported.  The patient has had poor oral intake over the last couple days.  The patient denied chest pain or headache and was felt to be at her baseline mental status.  No prior MI history.  supratherputic on apixiban.      The patient was noted to be desaturating hypotensive on arrival was placed on bilevel and quickly intubated.central line was placed, but an arterial line was challenging.      Laboratory studies were suggestive of both dehydration, KAYCE, leukocytosis  Sodium was 145 creatinine was 1.5 up from baseline of 0.8.  She was also bit uremic with a BUN of 42.  Leukocytosis of 19, platelets are 133.  ProBNP was markedly elevated at 94,000.  Troponin was elevated  at 1.12.  Lactate was elevated at 8.4.  INR is elevated at 5.    VBG after rescuitation is 6.9/66/30/14 with lactate 8 -> 9.  CVO2 sat is 28%.    She received 3 L of fluid started on dopamine and levofed via a central line.  She received Zosyn.    Chest x-ray   shows a clear clear right middle lobe versus right lower lobe dense alveolar infiltrate.  There is borderline cardiomegaly.  Endotracheal tube is in appropriate position.  NG tube in appropriate position.  There is left lower lobe atelectasis.  There are bibasilar pleural effusions.  The patient is overweight.    EKG has not been scanned in [facility] yet but per the emergency room provider did not show any STEMI or profound ischemic changes.    No CT imaging as of yet.        Assessment and plan :   This is a 78-year-old lady with a past mental history significant for subdural hematoma in 2016, stroke secondary to atrial fibrillation with residual hemiparesis and aphasia, current anticoagulation use for atrial fibrillation, memory impairment, gait instability needing assistance but living independently at home who is referred in for weakness.    She quickly decompensated upon arrival to the emergency room and has worsened despite therapy for sepsis . I suspect she has mixed cardiogenic/septic shock (CVO2 sat 28% + clamped down on exam).  I suspect the inciting event is an aspiration pneumonia, her initial chest x-ray shows a right middle versus right lower infiltrate that is blossoming on the subsequent x-ray after intubation.  Suspect stress CM ; MI/PE less likley due to apixiban toxicity from renal failure and potent anticoagulation currently.  Loud systolic murmer may be ischemic MR (worse over LLSB).        Neurologically is reportedly near basleine.  Lower concern for repeat CNS bleed at this time.      Her troponin is elevated she certainly likely has diastolic heart failure given her bibasilar effusions on several previous imaging studies.  However I  suspect as above there is a cardiogenic component to her circulatory failure, likely taketsubo but an ischemic event a few days ago leading to a gradual decompensation is possible.  We will check an echocardiogram and trend her troponins as well as get an EKG on arrival to the ICU.  PE unlikely with supratherptuic apixiban.     Discussed prognosis with multiple strokes, subdurals, chronic FTT with family and her .  Her  agreed that CPR is futile at this point and has made her DNR.  We will continue to provide supportive care.  He is going to think if she would want to be on dialysis which will likely need to happen in the coming days should she survive.      Neurology/Psychiatry:   >delerium  Fent/versed for pain/vent synchrony     Cardiovascular:   >cardiogenic shock  Markedly cool on exam, CVO2 21% on femoral line  Dopamine/levo/epi  MAP goal 55  Formal echocardiogram  Trend troponins  intial EKG in in ED w/o STEMI     > Sepsis due to presumed Pneumonia   May have mixed shock given infiltrates.    Given severity of illness, will give thamine/vitc/hydrocortisone/fludricortisone  -levophed/epi/dopamine  -bicarb drip    >loud systolic murrmer  Hardin over mitral area  Suspect MR  Poor windows on my echo  Formal echo in AM    >sinus tach  No a fib at this time  apixiban is on board still with renal failure.  Quite oozy during lines.     >troponemia  Likely demand, old MI not excluded  F/u echo  Trend troponins.      Pulmonary/Ventilator Management:   >RML pna  Check flu, sputum cultlure  Zosyn/vanco  Blood cultures    >acute hypoxemic resp failure  LTVV  hypervenitlate for profound metabolic acidosis    GI and Nutrition :   >nutrition  NPO    >marked lactate acidosis, c/f mesenteric ischemia  Consideration, but No clincal signs on exam of mesenteric ischemia (soft etc).  Suspect global hyperperfusion accounts for lactate rather than gut death.    Too unstable for CT  Trend abdominal exam.       Renal/Fluids/Electrolytes:   KAYCE  Anuric ATN from shock  Will require RRT in coming days  Family is considering if this is in line with goals of care    Infectious Disease:   >community dewlling adult with RML/rll infiltrate, leuckocytosis and ezio  Vanc/zosyn  tamiflu pending flu swab    Endocrine:   >no DM history  Insulin drip for sugar control    >check tsh given c/f cardiomyopathy    Hematology/Oncology:   >hgb goal 7  >leukcoytosis in context of infectoin    ICU Prophylaxis:   1. DVT: mechanical  2. VAP: HOB 30 degrees, chlorhexidine rinse  3. Stress Ulcer: PPI  4. Restraints: Nonviolent soft two point restraints required and necessary for patient safety and continued cares and good effect as patient continues to pull at necessary lines, tubes despite education and distraction. Will readdress daily.   5. Wound care  -   6. Feeding - NPO  7. Family Update:updated, now DNR  8. Disposition - ICU           Medical History:     Past Medical History:   Diagnosis Date     Acute, but ill-defined, cerebrovascular disease      Allergic rhinitis due to other allergen      Aphasia due to stroke      Coronary artery disease 3-12-15     Depressive disorder, not elsewhere classified      Edema      History of seizure 8/10/2016    --4/2016 --Generalized seizure in context of SDH, while on full anticoagulation      HYPERTENSION NOS 6/6/2006     Osteoporosis, unspecified      Other and unspecified hyperlipidemia      SDH (subdural hematoma) (H) 4/12/2016    --4/2016 --Presented with generalized seizure, while on warfarin for afib with Hx of CVA --S/P Craniotomy --Off anticoagulation      Unspecified cerebral artery occlusion with cerebral infarction      Past Surgical History:   Procedure Laterality Date     BACK SURGERY  2002 ?    Vertarra fused lower back     C NONSPECIFIC PROCEDURE      hymenotomy     C NONSPECIFIC PROCEDURE      tonsillectomy     C NONSPECIFIC PROCEDURE  1988 and 1997    D&C     C NONSPECIFIC  PROCEDURE      hysterscopy     C NONSPECIFIC PROCEDURE  2003    laminectomy     COLONOSCOPY  ?     CRANIOTOMY Left 4/13/2016    Procedure: CRANIOTOMY;  Surgeon: Scottie Navarrete MD;  Location:  OR     ORTHOPEDIC SURGERY       Social History     Socioeconomic History     Marital status:      Spouse name: Not on file     Number of children: 0     Years of education: Not on file     Highest education level: Not on file   Occupational History     Employer: RETIRED   Social Needs     Financial resource strain: Not on file     Food insecurity:     Worry: Not on file     Inability: Not on file     Transportation needs:     Medical: Not on file     Non-medical: Not on file   Tobacco Use     Smoking status: Never Smoker     Smokeless tobacco: Never Used   Substance and Sexual Activity     Alcohol use: No     Alcohol/week: 0.0 oz     Drug use: No     Sexual activity: Yes     Partners: Male     Birth control/protection: None   Lifestyle     Physical activity:     Days per week: Not on file     Minutes per session: Not on file     Stress: Not on file   Relationships     Social connections:     Talks on phone: Not on file     Gets together: Not on file     Attends Orthodoxy service: Not on file     Active member of club or organization: Not on file     Attends meetings of clubs or organizations: Not on file     Relationship status: Not on file     Intimate partner violence:     Fear of current or ex partner: Not on file     Emotionally abused: Not on file     Physically abused: Not on file     Forced sexual activity: Not on file   Other Topics Concern     Parent/sibling w/ CABG, MI or angioplasty before 65F 55M? No   Social History Narrative     Not on file        Allergies   Allergen Reactions     No Known Allergies               Key Medications:       sodium chloride 0.9%  2,000 mL Intravenous Once     etomidate         fentaNYL  25 mcg Intravenous Once     propofol         vancomycin in dextrose  1,000 mg  Intravenous Once       DOPamine 5 mcg/kg/min (03/14/19 0130)     norepinephrine 0.4 mcg/kg/min (03/14/19 0112)        Home Meds    No current facility-administered medications on file prior to encounter.   Current Outpatient Medications on File Prior to Encounter:  acetaminophen (TYLENOL) 325 MG tablet Take 650 mg by mouth 3 times daily as needed for mild pain   alendronate (FOSAMAX) 70 MG tablet    apixaban ANTICOAGULANT (ELIQUIS) 5 MG tablet Take 1 tablet (5 mg) by mouth 2 times daily   calcium carbonate-vitamin D 600-200 MG-UNIT TABS Take 1 tablet by mouth 2 times daily    cetirizine (ZYRTEC) 10 MG tablet Take 10 mg by mouth daily    LevETIRAcetam (KEPPRA PO) Take 1,000 mg by mouth 2 times daily   metoprolol succinate (TOPROL-XL) 50 MG 24 hr tablet TAKE 1 TABLET BY MOUTH EVERY DAY   order for DME Equipment being ordered: DressingWound #1 buttocks left , L 1-2  cm x, W 1-2 cm x, D   cm, Drainage scant, Thickness Partial Stage 2 pressure ulcerStage 1 pressure around this open area- about 2 cmType: Duoderm, Brand: , Size:  [id number]: as needed if dirty  Or every 3 to 7 daysTape/Wrap:   potassium chloride SA (POTASSIUM CHLORIDE) 20 MEQ tablet Take 1 tablet (20 mEq) by mouth 2 times daily   senna-docusate (SENOKOT-S;PERICOLACE) 8.6-50 MG per tablet Take 2 tablets by mouth daily as needed for constipation    simvastatin (ZOCOR) 20 MG tablet TAKE 1 TABLET (20 MG) BY MOUTH AT BEDTIME   venlafaxine (EFFEXOR-XR) 150 MG 24 hr capsule TAKE 1 CAPSULE (150 MG) BY MOUTH DAILY   VITAMIN E 400 UNIT OR TABS qd              Physical Examination:   Temp:  [97.8  F (36.6  C)] 97.8  F (36.6  C)  Pulse:  [] 101  Heart Rate:  [] 104  Resp:  [16-49] 34  BP: ()/() 104/89  SpO2:  [80 %-99 %] 86 %  No intake or output data in the 24 hours ending 03/14/19 0215  Wt Readings from Last 4 Encounters:   03/13/19 72.6 kg (160 lb)   02/18/19 72.1 kg (159 lb)   12/11/18 86.2 kg (190 lb)   10/05/18 86.2 kg (190 lb)     BP -  Mean:  [] 97  Ventilation Mode: CMV/AC  (Continuous Mandatory Ventilation/ Assist Control)  Rate Set (breaths/minute): 16 breaths/min  Tidal Volume Set (mL): 400 mL  PEEP (cm H2O): 5 cmH2O  Oxygen Concentration (%): 100 %  Resp: (!) 34    No lab results found in last 7 days.    GEN: no acute distress   HEENT: head ncat, sclera anicteric, OP patent, trachea midline   PULM: unlabored synchronous with vent, clear anteriorly    CV/COR: RRR S1S2 no gallop,  No rub, no murmur  ABD: soft nontender, hypoactive bowel sounds, no mass  EXT:  Edema   warm  NEURO: grossly intact  SKIN: no obvious rash  LINES: clean, dry intact         Data:   All data and imaging reviewed     ROUTINE ICU LABS (Last four results)  CMP  Recent Labs   Lab 03/13/19 2236   *   POTASSIUM 4.8   CHLORIDE 107   CO2 20   ANIONGAP 18*   *   BUN 42*   CR 1.56*   GFRESTIMATED 31*   GFRESTBLACK 36*   AAKASH 9.7     CBC  Recent Labs   Lab 03/13/19 2236   WBC 19.6*   RBC 4.33   HGB 13.9   HCT 42.8   MCV 99   MCH 32.1   MCHC 32.5   RDW 15.9*   *     INR  Recent Labs   Lab 03/13/19 2236   INR 4.94*     Arterial Blood GasNo lab results found in last 7 days.    All cultures:  No results for input(s): CULT in the last 168 hours.  Recent Results (from the past 24 hour(s))   XR Chest Port 1 View    Narrative    XR CHEST PORT 1 VW  3/14/2019 12:40 AM     HISTORY: Shortness of breath, post intubation.    COMPARISON: 6/17/2017.    FINDINGS: Supine portable chest. ET tube in place with tip  approximately 2 cm above the everette. NG tube passes into the stomach.  There is no pneumothorax. The heart size is normal. There are  bibasilar infiltrates, right greater than left.      Impression    IMPRESSION: ET tube 2 cm above the everette.       Tawanda Kilgore MD  Pulmonary & Critical Care Medicine    Billing: This patient is critically ill: Yes with the following organ systems are at at risk for substantial injury: cardiac, pulmonary, neurologic,  hematologic   Total critical care time today, exclusive of time spent performing procedures, was 50 min.

## 2019-03-14 NOTE — PROGRESS NOTES
[facility] ICU RESPIRATORY NOTE        Date of Admission: 3/13/2019    Date of Intubation (most recent): 03/14/19    Reason for Mechanical Ventilation: Severe Acidosis    Number of Days on Mechanical Ventilation: 1    Significant Events Today: Patient intubated and moved to ICU    ABG Results:   Recent Labs   Lab 03/14/19  0320   PH 7.02*   PCO2 30*   PO2 265*   HCO3 8*       Current Vent Settings: Ventilation Mode: CMV/AC  (Continuous Mandatory Ventilation/ Assist Control)  FiO2 (%): 100 %  Rate Set (breaths/minute): 20 breaths/min  Tidal Volume Set (mL): 450 mL  PEEP (cm H2O): 5 cmH2O  Oxygen Concentration (%): 100 %  Resp: (!) 32      Skin Assessment: no skin issues    Plan: Continue vent support.     Yenny Mendoza

## 2019-03-14 NOTE — PROGRESS NOTES
ICU progress note    Pt maxed on epi/levo/vaso  Increased peep in case MR, did not help  Fluid bolus did not help  Getting bicarb, steroids, abx.   Lactate now 10    Dismal prognosis.  Family updated of high liklehood of death, at bedside.  Will not add additional pressors as would be medically futile, family agreed    Tawanda Kilgore MD  Pulmonary & Critical Care Medicine  Pgr: [phone]

## 2019-03-14 NOTE — PROGRESS NOTES
Pt. Admitted to ICU @ 0245, vented on max Norepinephrine & Dopamine @ 5mcg. Extremeties black, cyanotic with mottling. Fingers purple. Pupils dilated, eyes roving, sluggish reactive too light Pt. Is slightly restless, however does not follow commands. Unable to obtain accurate BP, Dr. Kilgore ICU MD @ bedside will be inserting A-Line. O2 sats low, unable to obtain accurate Sat. See flowsheets for vitals. Anuric, esophageal temp 30.9.

## 2019-03-14 NOTE — DISCHARGE SUMMARY
DISCHARGE SUMMARY    DOA 3/13/14  Date of Death 3/14/19    Hospital problems:  Sepsis, due to bacterial pneumonia    Hospital course  This is a 78-year-old lady with a past mental history significant for subdural hematoma in 2016, stroke secondary to atrial fibrillation with residual hemiparesis and aphasia, current anticoagulation use for atrial fibrillation, memory impairment, gait instability needing assistance but living independently at home who is referred in for weakness and lethargy developing over the past 3-4 days. She likely had aspirated at home given recurrent dysphagia and her initial CXR showed an infiltrate in the lower lobes, c/w bacterial pneumonia.  She therefore presented with sepsis due to bacterial pneumonia which was present on admission.      In the emergency room she was found to be in profound septic shock acute hypoxemic respiratory failure presumed from pneumonia.  Unfortunately despite maximum supportive care with vasopressors steroids and antibiotics patient steadily deteriorated throughout the night and .  Time of death was 3/14/19 - 645 am  Cause of death was bacterial pneumonia.    REQUEST FOR AN AUTOPSY was declined by the patient's guardian.      DISCHARGE MEDS : NONE PATIENT   DISCHARGE DIET: NONE, PATIENT   FOLLOWUP APPT: NONE, PATIENT     Tawanda Kilgore MD  Pulmonary & Critical Care Medicine  Pgr: [phone]

## 2019-03-14 NOTE — PROGRESS NOTES
Pt. asystole. Dr. Alves notified to pronounce.  was @ bedside, still awaiting arrival of son & . Offered coffee & juice, declines @ this time.

## 2019-03-14 NOTE — ED PROVIDER NOTES
"  History     Chief Complaint:  Weakness    The history is provided by the patient and the spouse.      Cydney Bucio is a 78 year old female with a history of stroke with residual deficits including expressive aphasia and right sided hemiparesis, anticoagulated on Eliquis due to previous stroke, who presents with her  for evaluation of weakness. The patient's  reports that all day today the patient has been \"out of it.\" Just prior to presentation, the patient was being helped to the bathroom with her , she was using her walker, he was right behind her when she \"slid\" to the floor.  was able to guide the patient to the floor and she did not sustain any injuries. He called EMS.     states that occasionally her right side will be cold and her left side will be warm. The patient does have a history of aspiration and had a swallow study performed 1 week ago whoich was reportedly unremarkable; she has no restrictions.  does note that the patient has been \"gagging\" but not vomiting. Patient denies headache, chest pain, shortness of breath, vomiting, fevers, abdominal pain, chills, numbness, new weakness, blood in stool, or other any other complaint. No history of UTI or heart disease. No use of at-home O2 or history of pneumonia. Patient is ambulatory at baseline with her husbands close assistance and walker. No history of kidney problems.    Allergies:  No Known Allergies      Medications:    Fosamax  Eliquis  Zyrtec  Keppra  Senokot  Zocor  Effexor     Past Medical History:    Spinal stenosis  [facility]  Long term (current) use of anticoagulants  Subdural hematoma  Physical deconditioning  Essential hypertension  Paroxysmal atrial fibrillation  Anxiety state  Allergic rhinitis  Osteoporosis  Aphasia due to stroke    Past Surgical History:    Vertarra fused lower back  Hymenotomy  Tonsillectomy  D&C  Laminectomy  Craniotomy  Orthopedic surgery    Family History:    MI  Uterine " cancer  Colon cancer  Osteoarthritis     Social History:  Presents with spouse   Tobacco use: Never smoker   Alcohol use: No  PCP: Jeffery Zamora    Marital Status:        Review of Systems   Constitutional: Negative for chills and fever.   Respiratory: Negative for shortness of breath.    Cardiovascular: Negative for chest pain.   Gastrointestinal: Negative for abdominal pain, blood in stool and vomiting.   Neurological: Positive for weakness (generalized). Negative for numbness and headaches.   All other systems reviewed and are negative.    Physical Exam     Patient Vitals for the past 24 hrs:   BP Temp Temp src Pulse Heart Rate Resp SpO2 Height Weight   03/14/19 0230 (!) 164/119 -- -- 100 103 (!) 32 (!) 76 % -- --   03/14/19 0220 (!) 119/111 -- -- 99 98 (!) 43 (!) 73 % -- --   03/14/19 0210 92/53 -- -- 99 99 (!) 31 (!) 79 % -- --   03/14/19 0200 91/81 -- -- 100 100 (!) 41 (!) 70 % -- --   03/14/19 0157 104/89 -- -- 101 104 (!) 34 -- -- --   03/14/19 0155 (!) 66/33 -- -- 102 102 (!) 32 -- -- --   03/14/19 0150 (!) 54/39 -- -- 103 102 (!) 35 -- -- --   03/14/19 0145 (!) 184/169 -- -- 101 101 (!) 35 -- -- --   03/14/19 0140 (!) 144/117 -- -- 101 101 (!) 49 -- -- --   03/14/19 0135 (!) 109/94 -- -- 102 101 (!) 31 -- -- --   03/14/19 0130 (!) 89/46 -- -- 101 100 (!) 41 -- -- --   03/14/19 0125 (!) 123/108 -- -- 99 96 30 -- -- --   03/14/19 0120 (!) 105/91 -- -- 97 98 29 -- -- --   03/14/19 0115 103/53 -- -- 95 92 27 -- -- --   03/14/19 0110 110/81 -- -- 95 98 28 (!) 86 % -- --   03/14/19 0105 (!) 41/32 -- -- 98 97 28 (!) 81 % -- --   03/14/19 0100 (!) 143/130 -- -- 98 97 (!) 33 (!) 81 % -- --   03/14/19 0009 (!) 87/74 -- -- -- 86 -- -- -- --   03/14/19 0005 (!) 78/54 -- -- 88 89 (!) 31 (!) 80 % -- --   03/14/19 0000 (!) 57/39 -- -- 102 99 (!) 35 98 % -- --   03/13/19 2355 (!) 68/38 -- -- 97 106 (!) 41 92 % -- --   03/13/19 2350 104/90 -- -- 103 101 (!) 36 (!) 88 % -- --   03/13/19 2345 91/74 -- -- 101 100  "(!) 40 -- -- --   03/13/19 2340 (!) 87/54 -- -- 101 103 28 -- -- --   03/13/19 2335 95/55 -- -- -- 102 28 (!) 86 % -- --   03/13/19 2320 -- -- -- -- 102 -- 97 % -- --   03/13/19 2315 -- -- -- -- 98 -- 99 % -- --   03/13/19 2310 -- -- -- -- 103 -- 96 % -- --   03/13/19 2239 (!) 85/72 97.8  F (36.6  C) Oral 102 -- 16 (!) 88 % 1.575 m (5' 2\") 72.6 kg (160 lb)     Physical Exam    Physical Exam   Constitutional:  Frail, elderly, 78 year old. Ill appearing. Pale  HENT:   Mouth/Throat:   Dry mucous membranes.  Eyes:    Conjunctivae normal and EOM are normal. Pupils are equal, round, and reactive to light.   Neck:    Normal range of motion.   Cardiovascular: Tachycardic, regular rhythm and normal heart sounds.  Exam reveals no gallop and no friction rub.  No murmur heard.  Pulmonary/Chest:  Diminished lung sounds bilaterally with crackles at the right base throughout the entire lung.  Abdominal:   Soft. Bowel sounds are normal. Patient exhibits no mass. There is no tenderness. There is no rebound and no guarding.   Musculoskeletal:  Normal range of motion. Lifts both legs off bed. Patient exhibits no edema.   Neurological:   Patient is awake and oriented to person. She is profoundly weak with right sided residual hemiparesis, moderate expressive aphasia which is preexisting.  Skin:   Mottled extremities and cold to the touch.  Psychiatric:   Patient has a normal mood.     Emergency Department Course   ECG (22:36:23):  Rate 106 bpm. IN interval 192. QRS duration 72. QT/QTc 348/462. P-R-T axes 30 -19 79. Sinus tachycardia. Inferior infarct, age undetermined. Abnormal ECG. Agree with computer interpretation. No significant change when compared to EKG dated 04/06/2018.  Interpreted at 2240 by Stacey Montes MD.    ECG (23:58:58):  Rate 102 bpm. IN interval 170. QRS duration 68. QT/QTc 390/508. P-R-T axes 30 -18 88. Sinus tachycardia with premature atrial complexes. Possible left atrial enlargement. Nonspecific ST and T " "wave abnormality. Abnormal ECG. Agree with computer interpretation. Interpreted at 0003 by Stacey Montes MD.     Imaging:  Radiographic findings were communicated with the patient who voiced understanding of the findings.    XR Chest, 2 views:  IMPRESSION: ET tube 2 cm above the everette.    Imaging independently reviewed and agree with radiologist interpretation.     Laboratory:  CBC: WBC 19.6 (H),  (L) o/w WNL (HGB 13.9)   BMP: Na 145 (H), Anion gap 18 (H),  (H), BUN 42 (H), Creatinine 1.56 (H) o/w WNL  2346: Troponin: 1.222 (HH)  Nt probnp inpatient (BNP): 32557 (H)  PTT: 39 (H)   INR: 4.94 (H)  Blood Culture (2x): Pending     2321: Lactic Acid: 8.4 (HH)  0207: Lactic Acid: 9.4 (HH)    UA with micro: Pending  Urine culture: Pending     Procedures:     Intubation      INDICATION: Acute respiratory distress and altered mental status.    PERFORMED BY: Stacey Montes MD.    CONSENT: Consent from  was obtained after discussing the risks, benefits and alternatives with the Family.    TIMEOUT: Immediately prior to procedure a \"time out\" was called to verify the correct patient, procedure, equipment, support staff and site/side marked as required.    INTUBATION METHOD: Glidescope    PATIENT STATUS: RSI    PREOXYGENATION: Mask    PRETREATMENT MEDICATIONS: None    SEDATIVES: Etomidate    PARALYTIC: succinylcholine    LARYNGOSCOPE SIZE: Mac 3    TUBE SIZE: 7.5 cuffed with cuff inflated after placement  Number of attempts: 1  Cricoid pressure: No  Cords visualized: yes    POST-PROCEDURE ASSESSMENT: Breath sounds equal bilaterally with chest rise and absent over the epigastrium, Chest x-ray interpreted by me demonstrating endotracheal tube in appropriate position and CO2 detector.    ETT TO TEETH: 23 cm  Tube secured with: ETT jang    Patient tolerated the procedure well with no immediate complications.  COMPLICATIONS:  None        Central Line Placement       PROCEDURE:  Central Line " Placement with Ultrasound Guidance.    INDICATIONS: Central pressure monitoring, Sepsis (monitoring of mixed venous oxygen saturations) and Fluid/pressor administration    CONSENT: Risks (including but not limited to: pneumothorax,  bleeding, infection or artery puncture), benefits and alternatives were discussed with  unable to obtain due to emergency conditions and consent for procedure was obtained.    TIMEOUT: Universal protocol was followed. TIME OUT conducted just prior to starting procedure confirmed patient identity, site/side, procedure, patient position, and availability of correct equipment, and implants.?  Yes      MEDICATION: None    PROCEDURAL NOTE: Right Femoral and the right femoral area was prepped, cleansed and draped in a sterile fashion.  Mask, gown and gloves were used per sterile protocol.  Vascular probe with ultrasound was used in a sterile fashion for guidance.  Introducer needle was then used to gain access to the central venous circulation.  Using Seldinger technique the catheter was successfully placed. Catheter port(s) were aspirated and flushed.  Central line was sutured in place and sterile dressing applied.    PATIENT STATUS: Patient tolerated the procedure   well. There were  no complications.        Arterial Line Placement    PROCEDURE: Arterial Line Placement.   INDICATIONS: Vascular access   CONSENT: Risks (including but not limited to: bleeding and infection), benefits and alternatives were discussed with spouse and consent for procedure was obtained.   PROCEDURAL NOTE: Right Femoral area was prepped, cleansed and draped in a sterile fashion. Mask and gloves were used per sterile protocol. Introducer needle was then used in attempt to gain access to the right femoral artery circulation. This attempt was unsuccessful as we were unable to thread the wire successfully.    Interventions:  2250: NS 1L IV Bolus    2333: NS 1L IV Bolus  0006: Zosyn 3.375 g IV Infusion    0010: NS 1L IV  Bolus    0100: Fentanyl 25 mcg IV   0100: NS 1L IV Bolus    0112: Levophed 16 mg in 250 mL infusion gtt  0130: Dopamine in Dextrose 5% and 0.45% NaCl infusion 1L IV  gtt  0140: NS 1L IV Bolus    Vancocin 1000 mg in NaCl 0.9% 200 mL IV Infusion     Emergency Department Course:  Past medical records, nursing notes, and vitals reviewed.  2247: I performed an exam of the patient and obtained history, as documented above. Notably, triage BP is 85/72.    IV inserted and blood drawn. Above interventions provided. Blood was sent to the lab for further testing, results above. Sepsis protocol triggered    2315: Patient placed on BiPAP.    2355: Patient's pressure have dropped to 68/38 and patient is exhibiting respiratory distress. Plan is to possibly intubate if patient's pressures stay low and respiratory distress continues.    0020: Patient transferred from ED25 to ST01.    0029: Patient SpO2 saturation at 78%.    0032: Intubation performed, see above for procedure note. 23 at the teeth.    0038: Point of care chest x-ray performed. Reviewed at bedside. ETT pulled back 2 cm.    0050: Central line initiated. See above for details. Arterial line attempted. Dr. Ngo, fellow emergency room physician, helped with this procedure.    Findings and plan explained to the spouse who consents to admission.     0120: Discussed the patient with Dr. Kilgore, who will admit the patient to an ICU bed for further monitoring, evaluation, and treatment.       Impression & Plan      CMS Diagnoses:   The patient has signs of Septic Shock as evidenced by:    1. Presence of Sepsis, AND  2. Lactic Acid level greater than or equal to 4    Time septic shock diagnosis confirmed = 2321 as this was the time when Lactate was resulted and the level was greater than or equal to 4      3 Hour Septic Shock Bundle Completion:  1. Initial Lactic Acid Result:   Recent Labs   Lab Test 03/13/19  2259   LACT 8.4*     2. Blood Cultures before Antibiotics:  Yes  3. Broad Spectrum Antibiotics Administered: Yes     Anti-infectives (From now, onward)    Start     Dose/Rate Route Frequency Ordered Stop    03/14/19 0145  vancomycin (VANCOCIN) 1000 mg in dextrose 5% 200 mL PREMIX      1,000 mg  200 mL/hr over 1 Hours Intravenous ONCE 03/14/19 0144          4. 5000 mL IV fluids.  Ideal body weight: 50.1 kg (110 lb 7.2 oz)  Adjusted ideal body weight: 59.1 kg (130 lb 4.3 oz)    Severe Sepsis reassessment:  1. Repeat Lactic Acid Level: 9.4  2. MAP>65 after initial IVF bolus, will continue to monitor fluid status and vital signs  I attest to having performed a repeat sepsis exam and assessment of perfusion at 0200 and the results demonstrate mild improvement due to improved MAP.    Medical Decision Making:  Cydney Bucio is a 78 year old female presenting with generalized weakness but she really had no other complaints such as headache chest pain shortness of breath or abdominal pain.  She is on Eliquis and denies any blood in stool or urine.  Although she really had no complaints other than generalized weakness, she was profoundly hypotensive as well as tachycardic on arrival, but afebrile. She had a completely benign abdominal exam, no evidence of skin infection. Exam suspicious for pneumonia.     She was hypoxic and I suspect that this is secondary to vascular clamping down.  I placed her on BiPAP immediately and she did do well on that initially. Her pulse ox wave form was consistently very poor due to poor perfusion. We alternated between finger, ear lobe, and nose. I aggressively fluid hydrated her due to hypotension and suspected sepsis whilst getting her lactic acid back. Once that came back I empirically treated her with zosyn. I suspect that the aggressive fluid hydration made her more short of breath despite BIPAP.  Therefore I elected to intubate her after speaking with the .  Intubation was uncomplicated.  Chest x-ray was performed afterward and this  surprisingly does not show any acute pneumonia or pleural effusion.  The ET tube was pulled back 2 cm after reviewing the chest x-ray .      A Nunn catheter was placed to help monitor ins and outs but essentially no urine has become out so I do not unable to test it.  I suspect that that may be the source of her infection as the area was very malodorous. She has a significant leukocytosis, she has global ischemia, as evidenced by renal failure, elevated troponin, coagulapathy.  Her EKG does not show a STEMI.  She has an anion gap as well.  Blood sugar is normal.  Her BNP is significantly elevated.  Again with septic shock she is not perfusing well so has a cardiac injury but no STEMI.  She has received a total of 5 L of normal saline at this time and she is currently on both dopamine as well as Levophed which has sustained her systolic blood pressure just above 100.  Treated her empirically for septic shock with Zosyn and Vanco.  At this point, admitting to ICU.  I was unsuccessful in placing an art line.  While I could access the femoral artery, I was not able to successfully thread the wire.  I made the intensivist aware and able to place one upstairs.  At this time, she is stable, however profoundly gravely ill. Her repeat lactic is worse, pressures now stable.    Critical Care time was 90 minutes for this patient excluding procedures.    Diagnosis:    ICD-10-CM   1. Septic shock (H) A41.9   2.      Respiratory Failure    Disposition:  Admitted to hospitalist service.    Scribe Disclosure:  I, Romero Devante, am serving as a scribe at 10:59 PM on 3/13/2019 to document services personally performed by Stacey Montes MD based on my observations and the provider's statements to me.   3/13/2019    EMERGENCY DEPARTMENT     Stacey Montes MD  03/14/19 0259

## 2019-03-14 NOTE — PROGRESS NOTES
"HR dropped into the 50's,  @ bedside. Aware of grave situation . Offered spiritual support, declines \"I called my \".  called son, he will be coming in. Pt. Is maxed out on Epi, Norepi & Dopamine   "

## 2019-03-20 LAB
BACTERIA SPEC CULT: NO GROWTH
BACTERIA SPEC CULT: NO GROWTH
INTERPRETATION ECG - MUSE: NORMAL
Lab: NORMAL
Lab: NORMAL
SPECIMEN SOURCE: NORMAL
SPECIMEN SOURCE: NORMAL
